# Patient Record
Sex: MALE | Race: WHITE | NOT HISPANIC OR LATINO | Employment: OTHER | URBAN - METROPOLITAN AREA
[De-identification: names, ages, dates, MRNs, and addresses within clinical notes are randomized per-mention and may not be internally consistent; named-entity substitution may affect disease eponyms.]

---

## 2022-03-20 PROBLEM — N40.1 BENIGN PROSTATIC HYPERPLASIA WITH INCOMPLETE BLADDER EMPTYING: Status: ACTIVE | Noted: 2022-03-20

## 2022-03-20 PROBLEM — R39.14 BENIGN PROSTATIC HYPERPLASIA WITH INCOMPLETE BLADDER EMPTYING: Status: ACTIVE | Noted: 2022-03-20

## 2022-03-20 PROBLEM — N21.0 BLADDER STONE: Status: ACTIVE | Noted: 2022-03-20

## 2022-03-20 PROBLEM — N20.0 NEPHROLITHIASIS: Status: ACTIVE | Noted: 2022-03-20

## 2022-03-20 NOTE — PROGRESS NOTES
Chief Complaint: Urologic complaint    Subjective         History of Present Illness  Carlos Kaiser is a 71 y.o. male     BPH  Bladder stones  Gross hematuria    Slow stream sometimes..  No trouble with initiation or intermittency of stream. Nocturia X2-3  .  No urgency or frequency.  No incontinence.  On Flomax 0.4 mg daily and Terazosin 2 mg daily.  He has been on these for a while.    Patient had gross hematuria few weeks back, never any before this.    2/4/2022 CT abdomen/pelvis without - Flaget - new from 2015 - 6 stones in the urinary bladder, measuring up to 3 mm with Arnett in place.  No hydronephrosis.  Soft tissue density in the presacral space with some dystrophic calcification similar to 2015.  Few small renal cyst slightly increased from 2015.  Largest is 4 cm in the upper pole right kidney.  No renal or ureteral stones.  Colostomy with small parastomal hernia.  December 2015.    PVR    3/22      35     1/22 UA-trace blood, urine culture negative    No dysuria or recurrent urinary tract infections.  No history of kidney stone.    No urologic family history,   Has never had any urologic surgery.    No cardiopulmonary history.  Patient does not smoke.  Patient does not use blood thinner.    PSA    Unsure if patient had any previous PSAs    Results for orders placed or performed in visit on 03/21/22   Bladder Scan   Result Value Ref Range    Urine Volume 35    POC Urinalysis Dipstick    Specimen: Urine   Result Value Ref Range    Color Yellow Yellow, Straw, Dark Yellow, Agnes    Clarity, UA Clear Clear    Glucose, UA Negative Negative, 1000 mg/dL (3+) mg/dL    Bilirubin Negative Negative    Ketones, UA Negative Negative    Specific Gravity  1.015 1.005 - 1.030    Blood, UA Trace (A) Negative    pH, Urine 5.5 5.0 - 8.0    Protein, POC Negative Negative mg/dL    Urobilinogen, UA Normal Normal    Leukocytes Negative Negative    Nitrite, UA Negative Negative         Objective     No past medical history on  file.    No past surgical history on file.    No current outpatient medications on file.    Not on File     No family history on file.    Social History     Socioeconomic History   • Marital status:        Vital Signs:   There were no vitals taken for this visit.     Physical exam    Alert and orient x3  Well appearing, well developed, in no acute distress   Unlabored respirations    Grossly oriented to person, place and time, judgment is intact, normal mood and affect    No results found for this or any previous visit.          Assessment and Plan    Diagnoses and all orders for this visit:    1. Benign prostatic hyperplasia with incomplete bladder emptying (Primary)    2. Bladder stone        Records reviewed today and summarized in chart     Gross hematuria    Discussed with the patient his bladder stones are likely causing the hematuria but that being said we do need to rule out underlying pathology.  Patient understands not doing this could risk missing a urologic malignancy which be detrimental to health or cause death.    I will get him set up for CT urology protocol and cystoscopy.  Risk and benefits discussed    PSA -patient has had no PSA screening in the last 2 years, we discussed this today we will get this ordered after risk/benefits were discussed  BMP    Urine culture      BPH/bladder stone    Continue tamsulosin and Terazosin.  Start finasteride 5 mg daily.  We did discuss possibility of TURP once we get him worked up.  Patient would possibly consider surgery in the future    Follow-up for office cystoscopy

## 2022-03-21 ENCOUNTER — OFFICE VISIT (OUTPATIENT)
Dept: UROLOGY | Facility: CLINIC | Age: 72
End: 2022-03-21

## 2022-03-21 VITALS — HEIGHT: 69 IN | BODY MASS INDEX: 37.92 KG/M2 | WEIGHT: 256 LBS | RESPIRATION RATE: 19 BRPM

## 2022-03-21 DIAGNOSIS — Z12.5 PROSTATE CANCER SCREENING: ICD-10-CM

## 2022-03-21 DIAGNOSIS — N40.1 BENIGN PROSTATIC HYPERPLASIA WITH INCOMPLETE BLADDER EMPTYING: Primary | ICD-10-CM

## 2022-03-21 DIAGNOSIS — R39.14 BENIGN PROSTATIC HYPERPLASIA WITH INCOMPLETE BLADDER EMPTYING: Primary | ICD-10-CM

## 2022-03-21 DIAGNOSIS — N21.0 BLADDER STONE: ICD-10-CM

## 2022-03-21 DIAGNOSIS — R31.0 GROSS HEMATURIA: ICD-10-CM

## 2022-03-21 LAB
BILIRUB BLD-MCNC: NEGATIVE MG/DL
CLARITY, POC: CLEAR
COLOR UR: YELLOW
GLUCOSE UR STRIP-MCNC: NEGATIVE MG/DL
KETONES UR QL: NEGATIVE
LEUKOCYTE EST, POC: NEGATIVE
NITRITE UR-MCNC: NEGATIVE MG/ML
PH UR: 5.5 [PH] (ref 5–8)
PROT UR STRIP-MCNC: NEGATIVE MG/DL
RBC # UR STRIP: ABNORMAL /UL
SP GR UR: 1.01 (ref 1–1.03)
URINE VOLUME: 35
UROBILINOGEN UR QL: NORMAL

## 2022-03-21 PROCEDURE — 81002 URINALYSIS NONAUTO W/O SCOPE: CPT | Performed by: UROLOGY

## 2022-03-21 PROCEDURE — 99204 OFFICE O/P NEW MOD 45 MIN: CPT | Performed by: UROLOGY

## 2022-03-21 PROCEDURE — 51798 US URINE CAPACITY MEASURE: CPT | Performed by: UROLOGY

## 2022-03-21 PROCEDURE — 87086 URINE CULTURE/COLONY COUNT: CPT | Performed by: UROLOGY

## 2022-03-21 RX ORDER — TERAZOSIN 2 MG/1
2 CAPSULE ORAL
COMMUNITY
Start: 2022-01-29 | End: 2022-03-21

## 2022-03-21 RX ORDER — POTASSIUM CHLORIDE 750 MG/1
10 CAPSULE, EXTENDED RELEASE ORAL DAILY
COMMUNITY
Start: 2022-02-28

## 2022-03-21 RX ORDER — TAMSULOSIN HYDROCHLORIDE 0.4 MG/1
1 CAPSULE ORAL EVERY MORNING
Status: ON HOLD | COMMUNITY
Start: 2022-02-28 | End: 2022-05-05

## 2022-03-21 RX ORDER — FINASTERIDE 5 MG/1
5 TABLET, FILM COATED ORAL DAILY
Qty: 90 TABLET | Refills: 4 | Status: ON HOLD | OUTPATIENT
Start: 2022-03-21 | End: 2022-05-05

## 2022-03-21 RX ORDER — LISINOPRIL AND HYDROCHLOROTHIAZIDE 25; 20 MG/1; MG/1
1 TABLET ORAL DAILY
COMMUNITY
Start: 2022-01-20

## 2022-03-22 LAB — BACTERIA SPEC AEROBE CULT: NO GROWTH

## 2022-03-24 ENCOUNTER — LAB (OUTPATIENT)
Dept: LAB | Facility: HOSPITAL | Age: 72
End: 2022-03-24

## 2022-03-24 DIAGNOSIS — N21.0 BLADDER STONE: ICD-10-CM

## 2022-03-24 DIAGNOSIS — R39.14 BENIGN PROSTATIC HYPERPLASIA WITH INCOMPLETE BLADDER EMPTYING: ICD-10-CM

## 2022-03-24 DIAGNOSIS — R31.0 GROSS HEMATURIA: ICD-10-CM

## 2022-03-24 DIAGNOSIS — Z12.5 PROSTATE CANCER SCREENING: ICD-10-CM

## 2022-03-24 DIAGNOSIS — N40.1 BENIGN PROSTATIC HYPERPLASIA WITH INCOMPLETE BLADDER EMPTYING: ICD-10-CM

## 2022-03-24 LAB
ANION GAP SERPL CALCULATED.3IONS-SCNC: 10.7 MMOL/L (ref 5–15)
BUN SERPL-MCNC: 23 MG/DL (ref 8–23)
BUN/CREAT SERPL: 20.9 (ref 7–25)
CALCIUM SPEC-SCNC: 9 MG/DL (ref 8.6–10.5)
CHLORIDE SERPL-SCNC: 103 MMOL/L (ref 98–107)
CO2 SERPL-SCNC: 26.3 MMOL/L (ref 22–29)
CREAT SERPL-MCNC: 1.1 MG/DL (ref 0.76–1.27)
EGFRCR SERPLBLD CKD-EPI 2021: 71.3 ML/MIN/1.73
GLUCOSE SERPL-MCNC: 141 MG/DL (ref 65–99)
POTASSIUM SERPL-SCNC: 3.7 MMOL/L (ref 3.5–5.2)
PSA SERPL-MCNC: 0.58 NG/ML (ref 0–4)
SODIUM SERPL-SCNC: 140 MMOL/L (ref 136–145)

## 2022-03-24 PROCEDURE — G0103 PSA SCREENING: HCPCS

## 2022-03-24 PROCEDURE — 36415 COLL VENOUS BLD VENIPUNCTURE: CPT

## 2022-03-24 PROCEDURE — 80048 BASIC METABOLIC PNL TOTAL CA: CPT

## 2022-03-30 ENCOUNTER — HOSPITAL ENCOUNTER (OUTPATIENT)
Dept: CT IMAGING | Facility: HOSPITAL | Age: 72
Discharge: HOME | End: 2022-03-30
Admitting: UROLOGY

## 2022-03-30 DIAGNOSIS — N40.1 BENIGN PROSTATIC HYPERPLASIA WITH INCOMPLETE BLADDER EMPTYING: ICD-10-CM

## 2022-03-30 DIAGNOSIS — N21.0 BLADDER STONE: ICD-10-CM

## 2022-03-30 DIAGNOSIS — R31.0 GROSS HEMATURIA: ICD-10-CM

## 2022-03-30 DIAGNOSIS — R39.14 BENIGN PROSTATIC HYPERPLASIA WITH INCOMPLETE BLADDER EMPTYING: ICD-10-CM

## 2022-03-30 PROCEDURE — 0 IOPAMIDOL PER 1 ML: Performed by: UROLOGY

## 2022-03-30 PROCEDURE — 74178 CT ABD&PLV WO CNTR FLWD CNTR: CPT

## 2022-03-30 RX ADMIN — IOPAMIDOL 100 ML: 755 INJECTION, SOLUTION INTRAVENOUS at 12:48

## 2022-04-04 ENCOUNTER — TELEPHONE (OUTPATIENT)
Dept: SURGERY | Facility: CLINIC | Age: 72
End: 2022-04-04

## 2022-04-04 NOTE — TELEPHONE ENCOUNTER
Spoke with patient, made him aware his appt was moved up because he has bladder stones and needs to be set up for surgery

## 2022-04-04 NOTE — TELEPHONE ENCOUNTER
Pt states that he had an appt scheduled for 5/3 and it was cancelled and moved up to 4/25. He is concerned and wants to know if something was wrong with tests/labs (that he may need to know prior to appt). CB#[phone].

## 2022-04-23 NOTE — PROGRESS NOTES
Chief Complaint: Urologic complaint    Subjective         History of Present Illness  Carlos Kaiser is a 71 y.o. male     BPH  Bladder stones  Gross hematuria    Slow stream sometimes..  No trouble with initiation or intermittency of stream. Nocturia X2-3  .  No urgency or frequency.  No incontinence.  On Flomax 0.4 mg daily and Terazosin 2 mg daily.  He has been on these for a while.      Patient was started on finasteride 5 mg at his last appointment.  Just like things are little better.  No side effects.    3/30/2022 CT abdomen/pelvis with and without -41 g prostate, 10 stones all about 1 cm.  Bilateral duplicated system, delayed phase okay.  Post treatment change in the presacral location with increased soft tissue density that could be seen with fibrosis.  Mass due to malignancy cannot be excluded.  If concern for residual mass consider PET    3/22   1.1, GFR 71      No cardiopulmonary history.  Patient does not smoke.  Patient does not use blood thinner.    Previous    2/22 gross hematuria, never before    2/4/2022 CT abdomen/pelvis without - Flaget - new from 2015 - 6 stones in the urinary bladder, measuring up to 3 mm with Arnett in place.  No hydronephrosis.  Soft tissue density in the presacral space with some dystrophic calcification similar to 2015.  Few small renal cyst slightly increased from 2015.  Largest is 4 cm in the upper pole right kidney.  No renal or ureteral stones.  Colostomy with small parastomal hernia.  December 2015.    PVR    3/22      35     1/22 UA-trace blood, urine culture negative    No dysuria or recurrent urinary tract infections.  No history of kidney stone.    No urologic family history,   Has never had any urologic surgery.      PSA    4/22     0.57       Results for orders placed or performed in visit on 03/24/22   Basic Metabolic Panel    Specimen: Blood   Result Value Ref Range    Glucose 141 (H) 65 - 99 mg/dL    BUN 23 8 - 23 mg/dL    Creatinine 1.10 0.76 - 1.27 mg/dL     Sodium 140 136 - 145 mmol/L    Potassium 3.7 3.5 - 5.2 mmol/L    Chloride 103 98 - 107 mmol/L    CO2 26.3 22.0 - 29.0 mmol/L    Calcium 9.0 8.6 - 10.5 mg/dL    BUN/Creatinine Ratio 20.9 7.0 - 25.0    Anion Gap 10.7 5.0 - 15.0 mmol/L    eGFR 71.3 >60.0 mL/min/1.73   PSA Screen    Specimen: Blood   Result Value Ref Range    PSA 0.579 0.000 - 4.000 ng/mL         Objective     Past Medical History:   Diagnosis Date   • Benign prostatic hyperplasia    • Cancer (HCC)    • Hypertension        Past Surgical History:   Procedure Laterality Date   • CHOLECYSTECTOMY     • COLON SURGERY           Current Outpatient Medications:   •  finasteride (PROSCAR) 5 MG tablet, Take 1 tablet by mouth Daily., Disp: 90 tablet, Rfl: 4  •  lisinopril-hydrochlorothiazide (PRINZIDE,ZESTORETIC) 20-25 MG per tablet, Take 1 tablet by mouth Daily., Disp: , Rfl:   •  potassium chloride (MICRO-K) 10 MEQ CR capsule, Take 10 mEq by mouth Daily., Disp: , Rfl:   •  tamsulosin (FLOMAX) 0.4 MG capsule 24 hr capsule, Take 1 capsule by mouth Every Morning., Disp: , Rfl:     Allergies   Allergen Reactions   • Penicillins Unknown - High Severity        Family History   Problem Relation Age of Onset   • Cancer Mother    • Cancer Maternal Grandmother        Social History     Socioeconomic History   • Marital status:    Tobacco Use   • Smoking status: Never Smoker   • Smokeless tobacco: Never Used   Vaping Use   • Vaping Use: Never used   Substance and Sexual Activity   • Alcohol use: Never   • Drug use: Never   • Sexual activity: Defer       Vital Signs:   There were no vitals taken for this visit.     Physical exam    Alert and orient x3  Well appearing, well developed, in no acute distress   Unlabored respirations    Grossly oriented to person, place and time, judgment is intact, normal mood and affect    Results for orders placed or performed in visit on 03/24/22   Basic Metabolic Panel    Specimen: Blood   Result Value Ref Range    Glucose 141 (H) 65  - 99 mg/dL    BUN 23 8 - 23 mg/dL    Creatinine 1.10 0.76 - 1.27 mg/dL    Sodium 140 136 - 145 mmol/L    Potassium 3.7 3.5 - 5.2 mmol/L    Chloride 103 98 - 107 mmol/L    CO2 26.3 22.0 - 29.0 mmol/L    Calcium 9.0 8.6 - 10.5 mg/dL    BUN/Creatinine Ratio 20.9 7.0 - 25.0    Anion Gap 10.7 5.0 - 15.0 mmol/L    eGFR 71.3 >60.0 mL/min/1.73   PSA Screen    Specimen: Blood   Result Value Ref Range    PSA 0.579 0.000 - 4.000 ng/mL             Assessment and Plan    Diagnoses and all orders for this visit:    1. Benign prostatic hyperplasia with incomplete bladder emptying (Primary)    2. Bladder stone    3. Gross hematuria            CT images and read reviewed and discussed with the patient    BPH/bladder stone/ GH      Continue tamsulosin, Terazosin,  finasteride 5 mg daily.    At this time after discussion we will get him set up for cystolitholapaxy and transurethral resection of prostate.  Risks and benefits were discussed including bleeding, infection and damage to the urinary system.  We also discussed the risk of anesthesia up to and including death.  Patient voiced understanding and would like to proceed.      I did discuss with patient that his CT scan did show some soft tissue density in the presacral region.  He was seeing Dr. Palomo medical oncology at [facility] in the past.  We will get him an appointment to follow-up with Dr. Palomo nonurgently to discuss whether or not he would want him to have a PET scan

## 2022-04-25 ENCOUNTER — PREP FOR SURGERY (OUTPATIENT)
Dept: OTHER | Facility: HOSPITAL | Age: 72
End: 2022-04-25

## 2022-04-25 ENCOUNTER — OFFICE VISIT (OUTPATIENT)
Dept: UROLOGY | Facility: CLINIC | Age: 72
End: 2022-04-25

## 2022-04-25 VITALS — BODY MASS INDEX: 37.8 KG/M2 | RESPIRATION RATE: 18 BRPM | HEIGHT: 69 IN

## 2022-04-25 DIAGNOSIS — R31.0 GROSS HEMATURIA: ICD-10-CM

## 2022-04-25 DIAGNOSIS — N21.0 BLADDER STONE: ICD-10-CM

## 2022-04-25 DIAGNOSIS — R39.14 BENIGN PROSTATIC HYPERPLASIA WITH INCOMPLETE BLADDER EMPTYING: Primary | ICD-10-CM

## 2022-04-25 DIAGNOSIS — N40.1 BENIGN PROSTATIC HYPERPLASIA WITH INCOMPLETE BLADDER EMPTYING: Primary | ICD-10-CM

## 2022-04-25 DIAGNOSIS — N40.0 BPH (BENIGN PROSTATIC HYPERPLASIA): Primary | ICD-10-CM

## 2022-04-25 PROCEDURE — 99214 OFFICE O/P EST MOD 30 MIN: CPT | Performed by: UROLOGY

## 2022-04-25 RX ORDER — SODIUM CHLORIDE 0.9 % (FLUSH) 0.9 %
3 SYRINGE (ML) INJECTION EVERY 12 HOURS SCHEDULED
Status: CANCELLED | OUTPATIENT
Start: 2022-04-25

## 2022-04-25 RX ORDER — LEVOFLOXACIN 5 MG/ML
500 INJECTION, SOLUTION INTRAVENOUS ONCE
Status: CANCELLED | OUTPATIENT
Start: 2022-04-25 | End: 2022-04-25

## 2022-04-25 RX ORDER — SODIUM CHLORIDE 0.9 % (FLUSH) 0.9 %
10 SYRINGE (ML) INJECTION AS NEEDED
Status: CANCELLED | OUTPATIENT
Start: 2022-04-25

## 2022-04-25 RX ORDER — SODIUM CHLORIDE 9 MG/ML
100 INJECTION, SOLUTION INTRAVENOUS CONTINUOUS
Status: CANCELLED | OUTPATIENT
Start: 2022-04-25

## 2022-04-25 NOTE — H&P
[facility]   UROLOGY HISTORY AND PHYSICAL    Patient Name: Carlos Kaiser  : 1950  MRN: [id number]  Primary Care Physician:  Rafi Ferraro  Date of admission: (Not on file)    Subjective   Subjective     Chief Complaint: Bladder stones BPH    HPI:    Carlos Kaiser is a 71 y.o. male bladder stones BPH    Review of Systems     10 systems reviewed and are negative other than what is listed in HPI    Personal History     Past Medical History:   Diagnosis Date   • Benign prostatic hyperplasia    • Cancer (HCC)    • Hypertension        Past Surgical History:   Procedure Laterality Date   • CHOLECYSTECTOMY     • COLON SURGERY         Family History: family history includes Cancer in his maternal grandmother and mother. Otherwise pertinent FHx was reviewed and not pertinent to current issue.    Social History:  reports that he has never smoked. He has never used smokeless tobacco. He reports that he does not drink alcohol and does not use drugs.    Home Medications:  finasteride, lisinopril-hydrochlorothiazide, potassium chloride, and tamsulosin      Allergies:  Allergies   Allergen Reactions   • Penicillins Unknown - High Severity       Objective   Objective     Vitals:   Resp:  [18] 18  Physical Exam    Constitutional: Awake, alert    Respiratory: Clear to auscultation bilaterally, nonlabored respirations    Cardiovascular: RRR, no murmurs, rubs, or gallops, palpable pedal pulses bilaterally    Skin: No rashes     Result Review    Result Review:  I have personally reviewed the results from the time of this admission to 2022 12:07 EDT and agree with these findings:  []  Laboratory  []  Microbiology  []  Radiology  []  EKG/Telemetry   []  Cardiology/Vascular   []  Pathology  []  Old records  []  Other:    Assessment/Plan   Assessment / Plan     Brief Patient Summary:  Carlos Kaiser is a 71 y.o. male     Active Hospital Problems:  There are no active hospital problems to display for this patient.  Bladder  stone/BPH    Plan:       cystolitholapaxy and transurethral resection of prostate.  Risks and benefits were discussed including bleeding, infection and damage to the urinary system.  We also discussed the risk of anesthesia up to and including death.  Patient voiced understanding and would like to proceed.    Electronically signed by Be Espana MD, 04/25/22, 12:07 PM EDT.

## 2022-04-28 ENCOUNTER — TELEPHONE (OUTPATIENT)
Dept: UROLOGY | Facility: CLINIC | Age: 72
End: 2022-04-28

## 2022-04-28 NOTE — TELEPHONE ENCOUNTER
Called [facility] to get patient set up to see Dr Palomo to review CT scan and determine plan. Patient scheduled to see Dr Palomo 05/25/22 at 0800 in [place]. Called patient to give his information, he verbalized understanding via teach back.

## 2022-04-29 ENCOUNTER — TELEPHONE (OUTPATIENT)
Dept: UROLOGY | Facility: CLINIC | Age: 72
End: 2022-04-29

## 2022-04-29 RX ORDER — PRAZOSIN HYDROCHLORIDE 2 MG/1
2 CAPSULE ORAL NIGHTLY
Status: ON HOLD | COMMUNITY
End: 2022-05-05

## 2022-04-29 NOTE — TELEPHONE ENCOUNTER
Patient scheduled for surgery 05/05/22.  He did a Covid test at [facility], [place], operated by [facility] in  [place] on 04/25/22.  He wants to know if the results have been received yet.

## 2022-04-29 NOTE — TELEPHONE ENCOUNTER
I left a message for patient to contact me. Covid test results have been received.  If the hospital doesn't accept the results,  Dr. Espana will deem urgent.  Patient is good for surgery.

## 2022-05-05 ENCOUNTER — ANESTHESIA EVENT (OUTPATIENT)
Dept: PERIOP | Facility: HOSPITAL | Age: 72
End: 2022-05-05

## 2022-05-05 ENCOUNTER — HOSPITAL ENCOUNTER (OUTPATIENT)
Facility: HOSPITAL | Age: 72
Discharge: HOME | End: 2022-05-06
Attending: UROLOGY | Admitting: UROLOGY

## 2022-05-05 ENCOUNTER — ANESTHESIA (OUTPATIENT)
Dept: PERIOP | Facility: HOSPITAL | Age: 72
End: 2022-05-05

## 2022-05-05 DIAGNOSIS — N21.0 BLADDER STONES: Primary | ICD-10-CM

## 2022-05-05 DIAGNOSIS — N40.0 BPH (BENIGN PROSTATIC HYPERPLASIA): ICD-10-CM

## 2022-05-05 LAB
ANION GAP SERPL CALCULATED.3IONS-SCNC: 9.7 MMOL/L (ref 5–15)
BUN SERPL-MCNC: 25 MG/DL (ref 8–23)
BUN/CREAT SERPL: 22.3 (ref 7–25)
CALCIUM SPEC-SCNC: 9.2 MG/DL (ref 8.6–10.5)
CHLORIDE SERPL-SCNC: 104 MMOL/L (ref 98–107)
CO2 SERPL-SCNC: 25.3 MMOL/L (ref 22–29)
CREAT SERPL-MCNC: 1.12 MG/DL (ref 0.76–1.27)
EGFRCR SERPLBLD CKD-EPI 2021: 69.8 ML/MIN/1.73
GLUCOSE SERPL-MCNC: 175 MG/DL (ref 65–99)
POTASSIUM SERPL-SCNC: 4.2 MMOL/L (ref 3.5–5.2)
SODIUM SERPL-SCNC: 139 MMOL/L (ref 136–145)

## 2022-05-05 PROCEDURE — 88300 SURGICAL PATH GROSS: CPT | Performed by: UROLOGY

## 2022-05-05 PROCEDURE — 94799 UNLISTED PULMONARY SVC/PX: CPT

## 2022-05-05 PROCEDURE — 25010000002 LEVOFLOXACIN PER 250 MG: Performed by: UROLOGY

## 2022-05-05 PROCEDURE — 52318 REMOVE BLADDER STONE: CPT | Performed by: UROLOGY

## 2022-05-05 PROCEDURE — 25010000002 MIDAZOLAM PER 1 MG: Performed by: ANESTHESIOLOGY

## 2022-05-05 PROCEDURE — 80048 BASIC METABOLIC PNL TOTAL CA: CPT | Performed by: ANESTHESIOLOGY

## 2022-05-05 PROCEDURE — 25010000002 ONDANSETRON PER 1 MG: Performed by: NURSE ANESTHETIST, CERTIFIED REGISTERED

## 2022-05-05 PROCEDURE — 93005 ELECTROCARDIOGRAM TRACING: CPT | Performed by: ANESTHESIOLOGY

## 2022-05-05 PROCEDURE — 25010000002 KETOROLAC TROMETHAMINE PER 15 MG: Performed by: NURSE ANESTHETIST, CERTIFIED REGISTERED

## 2022-05-05 PROCEDURE — 52601 PROSTATECTOMY (TURP): CPT | Performed by: UROLOGY

## 2022-05-05 PROCEDURE — 25010000002 PROPOFOL 10 MG/ML EMULSION: Performed by: NURSE ANESTHETIST, CERTIFIED REGISTERED

## 2022-05-05 PROCEDURE — 25010000002 DEXAMETHASONE PER 1 MG: Performed by: NURSE ANESTHETIST, CERTIFIED REGISTERED

## 2022-05-05 PROCEDURE — 25010000002 FENTANYL CITRATE (PF) 50 MCG/ML SOLUTION: Performed by: NURSE ANESTHETIST, CERTIFIED REGISTERED

## 2022-05-05 PROCEDURE — 25010000002 HYDROMORPHONE 1 MG/ML SOLUTION: Performed by: NURSE ANESTHETIST, CERTIFIED REGISTERED

## 2022-05-05 PROCEDURE — 93010 ELECTROCARDIOGRAM REPORT: CPT | Performed by: INTERNAL MEDICINE

## 2022-05-05 PROCEDURE — 88305 TISSUE EXAM BY PATHOLOGIST: CPT | Performed by: UROLOGY

## 2022-05-05 PROCEDURE — 82365 CALCULUS SPECTROSCOPY: CPT | Performed by: UROLOGY

## 2022-05-05 RX ORDER — SODIUM CHLORIDE 9 MG/ML
100 INJECTION, SOLUTION INTRAVENOUS CONTINUOUS
Status: DISCONTINUED | OUTPATIENT
Start: 2022-05-05 | End: 2022-05-05

## 2022-05-05 RX ORDER — MEPERIDINE HYDROCHLORIDE 25 MG/ML
12.5 INJECTION INTRAMUSCULAR; INTRAVENOUS; SUBCUTANEOUS
Status: DISCONTINUED | OUTPATIENT
Start: 2022-05-05 | End: 2022-05-05 | Stop reason: HOSPADM

## 2022-05-05 RX ORDER — EPHEDRINE SULFATE 50 MG/ML
INJECTION, SOLUTION INTRAVENOUS AS NEEDED
Status: DISCONTINUED | OUTPATIENT
Start: 2022-05-05 | End: 2022-05-05 | Stop reason: SURG

## 2022-05-05 RX ORDER — ACETAMINOPHEN 500 MG
1000 TABLET ORAL ONCE
Status: COMPLETED | OUTPATIENT
Start: 2022-05-05 | End: 2022-05-05

## 2022-05-05 RX ORDER — PROMETHAZINE HYDROCHLORIDE 25 MG/1
25 SUPPOSITORY RECTAL ONCE AS NEEDED
Status: DISCONTINUED | OUTPATIENT
Start: 2022-05-05 | End: 2022-05-05 | Stop reason: HOSPADM

## 2022-05-05 RX ORDER — ONDANSETRON 2 MG/ML
INJECTION INTRAMUSCULAR; INTRAVENOUS AS NEEDED
Status: DISCONTINUED | OUTPATIENT
Start: 2022-05-05 | End: 2022-05-05 | Stop reason: SURG

## 2022-05-05 RX ORDER — TERAZOSIN 2 MG/1
2 CAPSULE ORAL NIGHTLY
COMMUNITY

## 2022-05-05 RX ORDER — SODIUM CHLORIDE, SODIUM LACTATE, POTASSIUM CHLORIDE, CALCIUM CHLORIDE 600; 310; 30; 20 MG/100ML; MG/100ML; MG/100ML; MG/100ML
9 INJECTION, SOLUTION INTRAVENOUS CONTINUOUS PRN
Status: DISCONTINUED | OUTPATIENT
Start: 2022-05-05 | End: 2022-05-05 | Stop reason: HOSPADM

## 2022-05-05 RX ORDER — SODIUM CHLORIDE 0.9 % (FLUSH) 0.9 %
3 SYRINGE (ML) INJECTION EVERY 12 HOURS SCHEDULED
Status: DISCONTINUED | OUTPATIENT
Start: 2022-05-05 | End: 2022-05-05 | Stop reason: HOSPADM

## 2022-05-05 RX ORDER — ONDANSETRON 2 MG/ML
4 INJECTION INTRAMUSCULAR; INTRAVENOUS EVERY 6 HOURS PRN
Status: DISCONTINUED | OUTPATIENT
Start: 2022-05-05 | End: 2022-05-06 | Stop reason: HOSPADM

## 2022-05-05 RX ORDER — TAMSULOSIN HYDROCHLORIDE 0.4 MG/1
0.4 CAPSULE ORAL DAILY
COMMUNITY
End: 2022-05-06 | Stop reason: HOSPADM

## 2022-05-05 RX ORDER — TAMSULOSIN HYDROCHLORIDE 0.4 MG/1
1 CAPSULE ORAL DAILY
Status: ON HOLD | COMMUNITY
End: 2022-05-05

## 2022-05-05 RX ORDER — SODIUM CHLORIDE 9 MG/ML
60 INJECTION, SOLUTION INTRAVENOUS CONTINUOUS
Status: DISCONTINUED | OUTPATIENT
Start: 2022-05-05 | End: 2022-05-06 | Stop reason: HOSPADM

## 2022-05-05 RX ORDER — PROMETHAZINE HYDROCHLORIDE 12.5 MG/1
25 TABLET ORAL ONCE AS NEEDED
Status: DISCONTINUED | OUTPATIENT
Start: 2022-05-05 | End: 2022-05-05 | Stop reason: HOSPADM

## 2022-05-05 RX ORDER — TERAZOSIN 5 MG/1
5 CAPSULE ORAL NIGHTLY
Status: DISCONTINUED | OUTPATIENT
Start: 2022-05-05 | End: 2022-05-05

## 2022-05-05 RX ORDER — SODIUM CHLORIDE 0.9 % (FLUSH) 0.9 %
10 SYRINGE (ML) INJECTION AS NEEDED
Status: DISCONTINUED | OUTPATIENT
Start: 2022-05-05 | End: 2022-05-06 | Stop reason: HOSPADM

## 2022-05-05 RX ORDER — SODIUM CHLORIDE 0.9 % (FLUSH) 0.9 %
10 SYRINGE (ML) INJECTION EVERY 12 HOURS SCHEDULED
Status: DISCONTINUED | OUTPATIENT
Start: 2022-05-05 | End: 2022-05-06 | Stop reason: HOSPADM

## 2022-05-05 RX ORDER — ACETAMINOPHEN 650 MG/1
650 SUPPOSITORY RECTAL EVERY 4 HOURS PRN
Status: DISCONTINUED | OUTPATIENT
Start: 2022-05-05 | End: 2022-05-06 | Stop reason: HOSPADM

## 2022-05-05 RX ORDER — OXYCODONE HYDROCHLORIDE 5 MG/1
5 TABLET ORAL
Status: DISCONTINUED | OUTPATIENT
Start: 2022-05-05 | End: 2022-05-05 | Stop reason: HOSPADM

## 2022-05-05 RX ORDER — SODIUM CHLORIDE 0.9 % (FLUSH) 0.9 %
10 SYRINGE (ML) INJECTION AS NEEDED
Status: DISCONTINUED | OUTPATIENT
Start: 2022-05-05 | End: 2022-05-05 | Stop reason: HOSPADM

## 2022-05-05 RX ORDER — KETOROLAC TROMETHAMINE 30 MG/ML
INJECTION, SOLUTION INTRAMUSCULAR; INTRAVENOUS AS NEEDED
Status: DISCONTINUED | OUTPATIENT
Start: 2022-05-05 | End: 2022-05-05 | Stop reason: SURG

## 2022-05-05 RX ORDER — DEXAMETHASONE SODIUM PHOSPHATE 4 MG/ML
INJECTION, SOLUTION INTRA-ARTICULAR; INTRALESIONAL; INTRAMUSCULAR; INTRAVENOUS; SOFT TISSUE AS NEEDED
Status: DISCONTINUED | OUTPATIENT
Start: 2022-05-05 | End: 2022-05-05 | Stop reason: SURG

## 2022-05-05 RX ORDER — FINASTERIDE 5 MG/1
5 TABLET, FILM COATED ORAL DAILY
COMMUNITY
End: 2022-05-06 | Stop reason: HOSPADM

## 2022-05-05 RX ORDER — PRAZOSIN HYDROCHLORIDE 1 MG/1
2 CAPSULE ORAL NIGHTLY
Status: DISCONTINUED | OUTPATIENT
Start: 2022-05-05 | End: 2022-05-06 | Stop reason: HOSPADM

## 2022-05-05 RX ORDER — ACETAMINOPHEN 325 MG/1
650 TABLET ORAL EVERY 4 HOURS PRN
Status: DISCONTINUED | OUTPATIENT
Start: 2022-05-05 | End: 2022-05-06 | Stop reason: HOSPADM

## 2022-05-05 RX ORDER — ONDANSETRON 4 MG/1
4 TABLET, FILM COATED ORAL EVERY 6 HOURS PRN
Status: DISCONTINUED | OUTPATIENT
Start: 2022-05-05 | End: 2022-05-06 | Stop reason: HOSPADM

## 2022-05-05 RX ORDER — DOCUSATE SODIUM 100 MG/1
100 CAPSULE, LIQUID FILLED ORAL 2 TIMES DAILY PRN
Status: DISCONTINUED | OUTPATIENT
Start: 2022-05-05 | End: 2022-05-06 | Stop reason: HOSPADM

## 2022-05-05 RX ORDER — ROCURONIUM BROMIDE 10 MG/ML
INJECTION, SOLUTION INTRAVENOUS AS NEEDED
Status: DISCONTINUED | OUTPATIENT
Start: 2022-05-05 | End: 2022-05-05 | Stop reason: SURG

## 2022-05-05 RX ORDER — MIDAZOLAM HYDROCHLORIDE 1 MG/ML
2 INJECTION INTRAMUSCULAR; INTRAVENOUS ONCE
Status: COMPLETED | OUTPATIENT
Start: 2022-05-05 | End: 2022-05-05

## 2022-05-05 RX ORDER — FENTANYL CITRATE 50 UG/ML
INJECTION, SOLUTION INTRAMUSCULAR; INTRAVENOUS AS NEEDED
Status: DISCONTINUED | OUTPATIENT
Start: 2022-05-05 | End: 2022-05-05 | Stop reason: SURG

## 2022-05-05 RX ORDER — LIDOCAINE HYDROCHLORIDE 20 MG/ML
INJECTION, SOLUTION EPIDURAL; INFILTRATION; INTRACAUDAL; PERINEURAL AS NEEDED
Status: DISCONTINUED | OUTPATIENT
Start: 2022-05-05 | End: 2022-05-05 | Stop reason: SURG

## 2022-05-05 RX ORDER — MAGNESIUM HYDROXIDE 1200 MG/15ML
LIQUID ORAL AS NEEDED
Status: DISCONTINUED | OUTPATIENT
Start: 2022-05-05 | End: 2022-05-05 | Stop reason: HOSPADM

## 2022-05-05 RX ORDER — FINASTERIDE 5 MG/1
5 TABLET, FILM COATED ORAL DAILY
Status: ON HOLD | COMMUNITY
End: 2022-05-05

## 2022-05-05 RX ORDER — OXYCODONE HYDROCHLORIDE 5 MG/1
5 TABLET ORAL EVERY 4 HOURS PRN
Status: DISCONTINUED | OUTPATIENT
Start: 2022-05-05 | End: 2022-05-06 | Stop reason: HOSPADM

## 2022-05-05 RX ORDER — PROPOFOL 10 MG/ML
VIAL (ML) INTRAVENOUS AS NEEDED
Status: DISCONTINUED | OUTPATIENT
Start: 2022-05-05 | End: 2022-05-05 | Stop reason: SURG

## 2022-05-05 RX ORDER — ONDANSETRON 2 MG/ML
4 INJECTION INTRAMUSCULAR; INTRAVENOUS ONCE AS NEEDED
Status: DISCONTINUED | OUTPATIENT
Start: 2022-05-05 | End: 2022-05-05 | Stop reason: HOSPADM

## 2022-05-05 RX ORDER — LEVOFLOXACIN 5 MG/ML
500 INJECTION, SOLUTION INTRAVENOUS ONCE
Status: COMPLETED | OUTPATIENT
Start: 2022-05-05 | End: 2022-05-05

## 2022-05-05 RX ADMIN — KETOROLAC TROMETHAMINE 30 MG: 30 INJECTION, SOLUTION INTRAMUSCULAR; INTRAVENOUS at 09:25

## 2022-05-05 RX ADMIN — PROPOFOL 200 MG: 10 INJECTION, EMULSION INTRAVENOUS at 09:15

## 2022-05-05 RX ADMIN — SUGAMMADEX 200 MG: 100 INJECTION, SOLUTION INTRAVENOUS at 10:40

## 2022-05-05 RX ADMIN — HYDROMORPHONE HYDROCHLORIDE 0.5 MG: 1 INJECTION, SOLUTION INTRAMUSCULAR; INTRAVENOUS; SUBCUTANEOUS at 10:47

## 2022-05-05 RX ADMIN — PRAZOSIN HYDROCHLORIDE 2 MG: 1 CAPSULE ORAL at 20:34

## 2022-05-05 RX ADMIN — DEXAMETHASONE SODIUM PHOSPHATE 4 MG: 4 INJECTION, SOLUTION INTRA-ARTICULAR; INTRALESIONAL; INTRAMUSCULAR; INTRAVENOUS; SOFT TISSUE at 09:15

## 2022-05-05 RX ADMIN — LEVOFLOXACIN 500 MG: 5 INJECTION, SOLUTION INTRAVENOUS at 09:13

## 2022-05-05 RX ADMIN — HYDROMORPHONE HYDROCHLORIDE 0.5 MG: 1 INJECTION, SOLUTION INTRAMUSCULAR; INTRAVENOUS; SUBCUTANEOUS at 10:45

## 2022-05-05 RX ADMIN — SODIUM CHLORIDE 60 ML/HR: 9 INJECTION, SOLUTION INTRAVENOUS at 16:21

## 2022-05-05 RX ADMIN — LIDOCAINE HYDROCHLORIDE 50 MG: 20 INJECTION, SOLUTION EPIDURAL; INFILTRATION; INTRACAUDAL; PERINEURAL at 09:15

## 2022-05-05 RX ADMIN — SODIUM CHLORIDE, POTASSIUM CHLORIDE, SODIUM LACTATE AND CALCIUM CHLORIDE: 600; 310; 30; 20 INJECTION, SOLUTION INTRAVENOUS at 10:50

## 2022-05-05 RX ADMIN — FENTANYL CITRATE 50 MCG: 50 INJECTION, SOLUTION INTRAMUSCULAR; INTRAVENOUS at 10:12

## 2022-05-05 RX ADMIN — ONDANSETRON 4 MG: 2 INJECTION INTRAMUSCULAR; INTRAVENOUS at 10:40

## 2022-05-05 RX ADMIN — Medication 10 ML: at 20:35

## 2022-05-05 RX ADMIN — MIDAZOLAM HYDROCHLORIDE 2 MG: 1 INJECTION, SOLUTION INTRAMUSCULAR; INTRAVENOUS at 08:37

## 2022-05-05 RX ADMIN — EPHEDRINE SULFATE 15 MG: 50 INJECTION INTRAVENOUS at 09:41

## 2022-05-05 RX ADMIN — ACETAMINOPHEN 1000 MG: 500 TABLET ORAL at 08:37

## 2022-05-05 RX ADMIN — ROCURONIUM BROMIDE 40 MG: 10 INJECTION INTRAVENOUS at 09:15

## 2022-05-05 RX ADMIN — FENTANYL CITRATE 50 MCG: 50 INJECTION, SOLUTION INTRAMUSCULAR; INTRAVENOUS at 09:15

## 2022-05-05 RX ADMIN — PROPOFOL 50 MG: 10 INJECTION, EMULSION INTRAVENOUS at 10:44

## 2022-05-05 RX ADMIN — SODIUM CHLORIDE, POTASSIUM CHLORIDE, SODIUM LACTATE AND CALCIUM CHLORIDE 9 ML/HR: 600; 310; 30; 20 INJECTION, SOLUTION INTRAVENOUS at 08:36

## 2022-05-05 NOTE — ANESTHESIA PREPROCEDURE EVALUATION
Anesthesia Evaluation     Patient summary reviewed and Nursing notes reviewed   no history of anesthetic complications:  NPO Solid Status: > 8 hours  NPO Liquid Status: > 2 hours           Airway   Mallampati: II  TM distance: >3 FB  Neck ROM: full  No difficulty expected  Dental      Pulmonary - negative pulmonary ROS and normal exam    breath sounds clear to auscultation  Cardiovascular - normal exam  Exercise tolerance: good (4-7 METS)    ECG reviewed  Rhythm: regular  Rate: normal    (+) hypertension,       Neuro/Psych- negative ROS  GI/Hepatic/Renal/Endo    (+)   renal disease,     Musculoskeletal (-) negative ROS    Abdominal    Substance History - negative use     OB/GYN negative ob/gyn ROS         Other - negative ROS                       Anesthesia Plan    ASA 3     general       Anesthetic plan, all risks, benefits, and alternatives have been provided, discussed and informed consent has been obtained with: patient.        CODE STATUS:

## 2022-05-05 NOTE — PLAN OF CARE
Problem: Adult Inpatient Plan of Care  Goal: Plan of Care Review  Outcome: Ongoing, Progressing  Goal: Patient-Specific Goal (Individualized)  Outcome: Ongoing, Progressing  Flowsheets (Taken 5/5/2022 1300)  Anxieties, Fears or Concerns: none  Goal: Absence of Hospital-Acquired Illness or Injury  Outcome: Ongoing, Progressing  Intervention: Prevent Skin Injury  Recent Flowsheet Documentation  Taken 5/5/2022 1200 by Heriberto Salvador RN  Body Position:   legs elevated   weight shifting  Goal: Optimal Comfort and Wellbeing  Outcome: Ongoing, Progressing  Intervention: Monitor Pain and Promote Comfort  Recent Flowsheet Documentation  Taken 5/5/2022 1200 by Heriberto Salvador RN  Pain Management Interventions:   pain management plan reviewed with patient/caregiver   position adjusted  Intervention: Provide Person-Centered Care  Recent Flowsheet Documentation  Taken 5/5/2022 1200 by Heriberto Salvador RN  Trust Relationship/Rapport:   care explained   thoughts/feelings acknowledged   reassurance provided  Goal: Readiness for Transition of Care  Outcome: Ongoing, Progressing  Intervention: Mutually Develop Transition Plan  Recent Flowsheet Documentation  Taken 5/5/2022 1404 by Heriberto Salvador RN  Equipment Currently Used at Home: none  Taken 5/5/2022 1403 by Heriberto Salvador RN  Transportation Anticipated: family or friend will provide  Patient/Family Anticipated Services at Transition: none  Patient/Family Anticipates Transition to: home with family   Goal Outcome Evaluation:

## 2022-05-05 NOTE — OP NOTE
CYSTOSCOPY TRANSURETHRAL RESECTION OF PROSTATE  Procedure Report    Patient Name:  Carlos Kaiser  YOB: 1950    Date of Surgery:  5/5/2022      Pre-op Diagnosis:   BPH (benign prostatic hyperplasia) [N40.0]       Postop diagnosis:    Same    Procedure/CPT® Codes:      Procedure(s):    Cystolitholapaxy, 6 -1 cm stones(total of 6 cm of stone)  transurethral resection of prostate    Staff:  Surgeon(s):  Be Espana MD         Anesthesia: General    Estimated Blood Loss: minimal    Implants:    Nothing was implanted during the procedure    Specimen:          Specimens     ID Source Type Tests Collected By Collected At Frozen?    A Prostate Tissue · TISSUE PATHOLOGY EXAM   Be Espana MD 5/5/22 1026 No    Description: PROSTATE CHIPS    B Urinary Bladder Calculus · TISSUE PATHOLOGY EXAM  · STONE ANALYSIS   Be Espana MD 5/5/22 1027 No    Description: BLADDER STONES              Findings:     6-1 cm stones all removed with the shock pulse device    Complete duplicated system on the right side with 2 ureteral orfices.  The most lateral and cephalad orifice was gaping.  Bladder was a little erythematous secondary to the stones but no pathology.  Left side only had one ureteral orifice, but on CT it was duplicated almost down to the bladder.    Prostate was opened up without issue 4 cm    Complications: none    Description of Procedure:     Brief description of procedure    After informed consent patient was taken to the operating room.  Patient was placed supine and placed under general anesthesia by the anesthesia team.  Patient was prepped and draped in normal sterile fashion after being placed in dorsal lithotomy position.  A multidisciplinary timeout was undertaken documenting the correct patient site and procedure.  At this point a 22 rigid cystoscope was advanced into the urethra.  Anterior urethra and prostatic urethra were normal.  Bladder was examined     See findings section    At  this point I went ahead and placed the nephroscope in and used the shock pulse device to pulverize the stones this took about 40 minutes.    All pieces were flushed out with a EIlick evacuator.      At this time I used a visual obturator to place a resectoscope into the bladder.  A large bipolar loop was then placed into the bladder and the median lobe was started.  This was resected down to the right above the verumontanum.  I then did the left lateral lobe and the right lateral lobe.  Also took a small amount of anterior tissue.  After this all pieces were removed from the bladder with Ellik evacuator.  Hemostasis was obtained.  22 Welsh three-way Arnett catheter was placed into the bladder with 30 cc of sterile water placed in the balloon.  This was placed to straight drainage and also attached to continuous bladder irrigation.  Patient tolerated the procedure well and was taken to the post anesthesia area without issue.          Be Espana MD     Date: 5/5/2022  Time: 10:48 EDT

## 2022-05-06 VITALS
OXYGEN SATURATION: 100 % | HEIGHT: 69 IN | WEIGHT: 247.14 LBS | SYSTOLIC BLOOD PRESSURE: 155 MMHG | HEART RATE: 75 BPM | BODY MASS INDEX: 36.6 KG/M2 | RESPIRATION RATE: 18 BRPM | TEMPERATURE: 98.24 F | DIASTOLIC BLOOD PRESSURE: 66 MMHG

## 2022-05-06 LAB
ANION GAP SERPL CALCULATED.3IONS-SCNC: 9.6 MMOL/L (ref 5–15)
BUN SERPL-MCNC: 22 MG/DL (ref 8–23)
BUN/CREAT SERPL: 19.5 (ref 7–25)
CALCIUM SPEC-SCNC: 9.5 MG/DL (ref 8.6–10.5)
CHLORIDE SERPL-SCNC: 105 MMOL/L (ref 98–107)
CO2 SERPL-SCNC: 24.4 MMOL/L (ref 22–29)
CREAT SERPL-MCNC: 1.13 MG/DL (ref 0.76–1.27)
CYTO UR: NORMAL
EGFRCR SERPLBLD CKD-EPI 2021: 69.1 ML/MIN/1.73
GLUCOSE SERPL-MCNC: 134 MG/DL (ref 65–99)
LAB AP CASE REPORT: NORMAL
LAB AP CLINICAL INFORMATION: NORMAL
PATH REPORT.FINAL DX SPEC: NORMAL
PATH REPORT.GROSS SPEC: NORMAL
POTASSIUM SERPL-SCNC: 4.6 MMOL/L (ref 3.5–5.2)
SODIUM SERPL-SCNC: 139 MMOL/L (ref 136–145)

## 2022-05-06 PROCEDURE — 94799 UNLISTED PULMONARY SVC/PX: CPT

## 2022-05-06 PROCEDURE — 80048 BASIC METABOLIC PNL TOTAL CA: CPT | Performed by: UROLOGY

## 2022-05-06 RX ORDER — HYDROCODONE BITARTRATE AND ACETAMINOPHEN 5; 325 MG/1; MG/1
1 TABLET ORAL EVERY 6 HOURS PRN
Qty: 15 TABLET | Refills: 0 | Status: SHIPPED | OUTPATIENT
Start: 2022-05-06 | End: 2022-06-06

## 2022-05-06 RX ADMIN — ACETAMINOPHEN 650 MG: 325 TABLET ORAL at 04:32

## 2022-05-06 RX ADMIN — SODIUM CHLORIDE 60 ML/HR: 9 INJECTION, SOLUTION INTRAVENOUS at 07:31

## 2022-05-06 RX ADMIN — Medication 10 ML: at 08:46

## 2022-05-06 RX ADMIN — HYDROCHLOROTHIAZIDE: 25 TABLET ORAL at 08:46

## 2022-05-06 NOTE — PLAN OF CARE
Problem: Adult Inpatient Plan of Care  Goal: Plan of Care Review  Outcome: Met  Goal: Patient-Specific Goal (Individualized)  Outcome: Met  Goal: Absence of Hospital-Acquired Illness or Injury  Outcome: Met  Intervention: Identify and Manage Fall Risk  Recent Flowsheet Documentation  Taken 5/6/2022 0830 by Heriberto Salvador RN  Safety Promotion/Fall Prevention: safety round/check completed  Intervention: Prevent Skin Injury  Recent Flowsheet Documentation  Taken 5/6/2022 0830 by Heriberto Salvador RN  Body Position:   position changed independently   weight shifting  Skin Protection: adhesive use limited  Intervention: Prevent and Manage VTE (Venous Thromboembolism) Risk  Recent Flowsheet Documentation  Taken 5/6/2022 0830 by Heriberto Salvador RN  Activity Management: up in chair  VTE Prevention/Management: sequential compression devices on  Intervention: Prevent Infection  Recent Flowsheet Documentation  Taken 5/6/2022 0830 by Heriberto Salvador RN  Infection Prevention: single patient room provided  Goal: Optimal Comfort and Wellbeing  Outcome: Met  Intervention: Monitor Pain and Promote Comfort  Recent Flowsheet Documentation  Taken 5/6/2022 0830 by Heriberto Salvador RN  Pain Management Interventions: pain management plan reviewed with patient/caregiver  Intervention: Provide Person-Centered Care  Recent Flowsheet Documentation  Taken 5/6/2022 0830 by Heriberto Salvador RN  Trust Relationship/Rapport: care explained  Goal: Readiness for Transition of Care  Outcome: Met     Problem: Bleeding (TURP)  Goal: Absence of Bleeding  Outcome: Met     Problem: Fluid and Electrolyte Imbalance (TURP)  Goal: Fluid and Electrolyte Balance  Outcome: Met     Problem: Infection (TURP)  Goal: Absence of Infection Signs and Symptoms  Outcome: Met     Problem: Ongoing Anesthesia Effects (TURP)  Goal: Anesthesia/Sedation Recovery  Outcome: Met  Intervention: Optimize Anesthesia Recovery  Recent Flowsheet  Documentation  Taken 5/6/2022 0830 by Heriberto Salvador RN  Patient Tolerance (IS): good  Safety Promotion/Fall Prevention: safety round/check completed  Administration (IS): instruction provided, follow-up  Level Incentive Spirometer (mL): 3000  Incentive Spirometer Predicted Level (mL): 2000  Number of Repetitions (IS): 10     Problem: Pain (TURP)  Goal: Acceptable Pain Control  Outcome: Met  Intervention: Prevent or Manage Pain  Recent Flowsheet Documentation  Taken 5/6/2022 0830 by Heriberto Salvador RN  Pain Management Interventions: pain management plan reviewed with patient/caregiver  Diversional Activities: smartphone     Problem: Postoperative Nausea and Vomiting (TURP)  Goal: Nausea and Vomiting Relief  Outcome: Met     Problem: Respiratory Compromise (TURP)  Goal: Effective Oxygenation and Ventilation  Outcome: Met  Intervention: Optimize Oxygenation and Ventilation  Recent Flowsheet Documentation  Taken 5/6/2022 0830 by Heriberto Salvador RN  Head of Bed (HOB) Positioning: HOB elevated     Problem: Urinary Elimination Impaired (TURP)  Goal: Effective Urinary Elimination  Outcome: Met  Intervention: Monitor and Manage Urine Output  Recent Flowsheet Documentation  Taken 5/6/2022 0830 by Heriberto Salvador RN  Urinary Elimination Promotion: catheter patency maintained   Goal Outcome Evaluation:

## 2022-05-06 NOTE — PLAN OF CARE
Goal Outcome Evaluation:     Pt is doing great on post-op day 1. CBI output is yellow with a pink-tinge. No clots visible. Pt states his pain is minimal, given one dose of Tylenol. No BM yet but he is passing gas and denies any nausea. He ambulated to chair this morning and is continuing to use his IS frequently.         Progress: improving

## 2022-05-07 LAB — QT INTERVAL: 351 MS

## 2022-05-09 ENCOUNTER — OFFICE VISIT (OUTPATIENT)
Dept: UROLOGY | Facility: CLINIC | Age: 72
End: 2022-05-09

## 2022-05-09 VITALS — HEIGHT: 69 IN | WEIGHT: 248 LBS | BODY MASS INDEX: 36.73 KG/M2 | RESPIRATION RATE: 16 BRPM

## 2022-05-09 DIAGNOSIS — N40.1 BENIGN PROSTATIC HYPERPLASIA WITH INCOMPLETE BLADDER EMPTYING: Primary | ICD-10-CM

## 2022-05-09 DIAGNOSIS — R39.14 BENIGN PROSTATIC HYPERPLASIA WITH INCOMPLETE BLADDER EMPTYING: Primary | ICD-10-CM

## 2022-05-09 LAB — URINE VOLUME: 0

## 2022-05-09 PROCEDURE — 99024 POSTOP FOLLOW-UP VISIT: CPT | Performed by: NURSE PRACTITIONER

## 2022-05-09 PROCEDURE — 51700 IRRIGATION OF BLADDER: CPT | Performed by: NURSE PRACTITIONER

## 2022-05-09 NOTE — PROGRESS NOTES
"Chief Complaint: Follow-up    Subjective         History of Present Illness  Carlos Kaiser is a 71 y.o. male presents to [facility] UROLOGY to be seen for void trial .    Patient is 4 days post TURP cystolitholapaxy.    He states some hematuria still but improved.     No pain.     Objective     Past Medical History:   Diagnosis Date   • Benign prostatic hyperplasia    • Bladder stone    • Cancer (HCC)     COLON CANCER- 2012- HAD CHEMO/ RADIATION   • Hypertension        Past Surgical History:   Procedure Laterality Date   • CHOLECYSTECTOMY  2012   • COLON SURGERY  2012    R/T CANCER (2012 AND 2014)   • COLOSTOMY     • CYSTOSCOPY TRANSURETHRAL RESECTION OF PROSTATE N/A 5/5/2022    Procedure: cystolitholapaxy and transurethral resection of prostate;  Surgeon: Be Espana MD;  Location: [facility];  Service: Urology;  Laterality: N/A;         Current Outpatient Medications:   •  HYDROcodone-acetaminophen (NORCO) 5-325 MG per tablet, Take 1 tablet by mouth Every 6 (Six) Hours As Needed for Mild Pain ., Disp: 15 tablet, Rfl: 0  •  lisinopril-hydrochlorothiazide (PRINZIDE,ZESTORETIC) 20-25 MG per tablet, Take 1 tablet by mouth Daily., Disp: , Rfl:   •  potassium chloride (MICRO-K) 10 MEQ CR capsule, Take 10 mEq by mouth Daily., Disp: , Rfl:   •  terazosin (HYTRIN) 2 MG capsule, Take 2 mg by mouth Every Night., Disp: , Rfl:     Allergies   Allergen Reactions   • Penicillins Rash     CHILDHOOD ALLERGY- NOT HAD SINCE THEN        Family History   Problem Relation Age of Onset   • Cancer Mother    • Cancer Maternal Grandmother    • Malig Hyperthermia Neg Hx        Social History     Socioeconomic History   • Marital status:    Tobacco Use   • Smoking status: Never Smoker   • Smokeless tobacco: Never Used   Vaping Use   • Vaping Use: Never used   Substance and Sexual Activity   • Alcohol use: Never   • Drug use: Never   • Sexual activity: Defer       Vital Signs:   Resp 16   Ht 175.3 cm (69\")  "  Wt 112 kg (248 lb)   BMI 36.62 kg/m²      Physical Exam     Result Review :   The following data was reviewed by: MICKEY Jones on 05/09/2022:  Results for orders placed or performed during the hospital encounter of 05/05/22   Basic Metabolic Panel    Specimen: Blood   Result Value Ref Range    Glucose 175 (H) 65 - 99 mg/dL    BUN 25 (H) 8 - 23 mg/dL    Creatinine 1.12 0.76 - 1.27 mg/dL    Sodium 139 136 - 145 mmol/L    Potassium 4.2 3.5 - 5.2 mmol/L    Chloride 104 98 - 107 mmol/L    CO2 25.3 22.0 - 29.0 mmol/L    Calcium 9.2 8.6 - 10.5 mg/dL    BUN/Creatinine Ratio 22.3 7.0 - 25.0    Anion Gap 9.7 5.0 - 15.0 mmol/L    eGFR 69.8 >60.0 mL/min/1.73   Basic Metabolic Panel    Specimen: Blood   Result Value Ref Range    Glucose 134 (H) 65 - 99 mg/dL    BUN 22 8 - 23 mg/dL    Creatinine 1.13 0.76 - 1.27 mg/dL    Sodium 139 136 - 145 mmol/L    Potassium 4.6 3.5 - 5.2 mmol/L    Chloride 105 98 - 107 mmol/L    CO2 24.4 22.0 - 29.0 mmol/L    Calcium 9.5 8.6 - 10.5 mg/dL    BUN/Creatinine Ratio 19.5 7.0 - 25.0    Anion Gap 9.6 5.0 - 15.0 mmol/L    eGFR 69.1 >60.0 mL/min/1.73   ECG 12 Lead   Result Value Ref Range    QT Interval 351 ms   Tissue Pathology Exam    Specimen: A: Prostate; Calculus    B: Urinary Bladder; Calculus   Result Value Ref Range    Case Report       Surgical Pathology Report                         Case: [id number]                                  Authorizing Provider:  Be Espana MD      Collected:           05/05/2022 10:27 AM          Ordering Location:     [facility] Received:            05/05/2022 12:31 PM                                 OR                                                                           Pathologist:           Anju Agudelo DO                                                       Specimens:   1) - Prostate, PROSTATE CHIPS                                                                       2) - Urinary Bladder, BLADDER STONES                                                        Clinical Information      Final Diagnosis       1. Prostate gland, transurethral resection:   - Benign prostatic hyperplasia    2.  Bladder stones, removal:   - Calculi, sent for chemical analysis (gross only diagnosis)       Gross Description       1. Prostate.  Prostate chips: Received in formalin are pink rubbery tissue fragments filtered and measuring 4 x 4 x 2 cm in aggregate weighing 8 g.  All 1A-1G.      2. Urinary Bladder.  Bladder stones: Received in a dry container are numerous dark yellow irregular fragmented stones measuring 4.5 cm in greatest aggregate dimension.  Gross examination only.   Reviewed by GJKHANG.   CRE        Microscopic Description        PSA    PSA 3/24/22   PSA 0.579               Irrigation of Bladder    Date/Time: 5/9/2022 12:22 PM  Performed by: Chiara Tam APRN  Authorized by: Chiara Tam APRN   Preparation: Patient was prepped and draped in the usual sterile fashion.  Local anesthesia used: no    Anesthesia:  Local anesthesia used: no    Sedation:  Patient sedated: no    Patient tolerance: patient tolerated the procedure well with no immediate complications  Comments: 240 CC sterile water instilled into bladder. Patient was able to void on his own after and PVR was 0               Assessment and Plan    Diagnoses and all orders for this visit:    1. Benign prostatic hyperplasia with incomplete bladder emptying (Primary)        Discussed with patient that some light hematuria is expected for the next several days after catheter removal.    We did discuss normal postoperative course including bladder leakage or potentially refractory retention.    Patient will call the office if he is with any worsening hematuria, pain, urinary retention.    Patient will return in 1 week for PVR check he will call the office if he is with any new or worsening symptoms.    I spent 10 minutes caring for Carlos on this date of service. This time  includes time spent by me in the following activities:reviewing tests, obtaining and/or reviewing a separately obtained history, performing a medically appropriate examination and/or evaluation , counseling and educating the patient/family/caregiver, ordering medications, tests, or procedures, and documenting information in the medical record  Follow Up   Return in about 1 week (around 5/16/2022) for f/u PVR check .  Patient was given instructions and counseling regarding his condition or for health maintenance advice. Please see specific information pulled into the AVS if appropriate.         This document has been electronically signed by MICKEY Jones  May 9, 2022 08:25 EDT

## 2022-05-13 LAB
COLOR STONE: NORMAL
COMPN STONE: NORMAL
LABORATORY COMMENT REPORT: NORMAL
LABORATORY COMMENT REPORT: NORMAL
Lab: NORMAL
Lab: NORMAL
PHOTO: NORMAL
SIZE STONE: NORMAL MM
SPEC SOURCE SUBJ: NORMAL
URATE MFR STONE: 100 %
WT STONE: 4744 MG

## 2022-05-17 ENCOUNTER — TELEPHONE (OUTPATIENT)
Dept: UROLOGY | Facility: CLINIC | Age: 72
End: 2022-05-17

## 2022-05-17 DIAGNOSIS — N20.0 NEPHROLITHIASIS: Primary | ICD-10-CM

## 2022-05-17 NOTE — TELEPHONE ENCOUNTER
Spoke with patient, made him aware  wants blood work prior to his appt on 5/23/22. Patient voiced understanding and is aware he can go to the lab at [place] to complete bloodwork.

## 2022-05-17 NOTE — TELEPHONE ENCOUNTER
----- Message from Be Espana MD sent at 5/17/2022  8:11 AM EDT -----  Patient needs a serum uric acid before his follow-up appointment.

## 2022-05-18 ENCOUNTER — LAB (OUTPATIENT)
Dept: LAB | Facility: HOSPITAL | Age: 72
End: 2022-05-18

## 2022-05-18 DIAGNOSIS — N20.0 NEPHROLITHIASIS: ICD-10-CM

## 2022-05-18 LAB — URATE SERPL-MCNC: 8 MG/DL (ref 3.4–7)

## 2022-05-18 PROCEDURE — 36415 COLL VENOUS BLD VENIPUNCTURE: CPT

## 2022-05-18 PROCEDURE — 84550 ASSAY OF BLOOD/URIC ACID: CPT

## 2022-05-23 ENCOUNTER — OFFICE VISIT (OUTPATIENT)
Dept: UROLOGY | Facility: CLINIC | Age: 72
End: 2022-05-23

## 2022-05-23 VITALS — WEIGHT: 246 LBS | RESPIRATION RATE: 16 BRPM | BODY MASS INDEX: 36.43 KG/M2 | HEIGHT: 69 IN

## 2022-05-23 DIAGNOSIS — R39.14 BENIGN PROSTATIC HYPERPLASIA WITH INCOMPLETE BLADDER EMPTYING: ICD-10-CM

## 2022-05-23 DIAGNOSIS — R30.0 DYSURIA: ICD-10-CM

## 2022-05-23 DIAGNOSIS — N20.0 NEPHROLITHIASIS: Primary | ICD-10-CM

## 2022-05-23 DIAGNOSIS — N40.1 BENIGN PROSTATIC HYPERPLASIA WITH INCOMPLETE BLADDER EMPTYING: ICD-10-CM

## 2022-05-23 LAB
BILIRUB BLD-MCNC: NEGATIVE MG/DL
CLARITY, POC: CLEAR
COLOR UR: YELLOW
EXPIRATION DATE: ABNORMAL
GLUCOSE UR STRIP-MCNC: NEGATIVE MG/DL
KETONES UR QL: NEGATIVE
LEUKOCYTE EST, POC: ABNORMAL
Lab: ABNORMAL
NITRITE UR-MCNC: NEGATIVE MG/ML
PH UR: 5.5 [PH] (ref 5–8)
PROT UR STRIP-MCNC: ABNORMAL MG/DL
RBC # UR STRIP: ABNORMAL /UL
SP GR UR: 1.02 (ref 1–1.03)
URINE VOLUME: NORMAL
UROBILINOGEN UR QL: NORMAL

## 2022-05-23 PROCEDURE — 99024 POSTOP FOLLOW-UP VISIT: CPT | Performed by: NURSE PRACTITIONER

## 2022-05-23 PROCEDURE — 81003 URINALYSIS AUTO W/O SCOPE: CPT | Performed by: NURSE PRACTITIONER

## 2022-05-30 NOTE — PROGRESS NOTES
Chief Complaint: Benign Prostatic Hypertrophy    Subjective         History of Present Illness  Carlos Kaiser is a 71 y.o. male presents to [facility] UROLOGY to be seen for f/u PVR check  .    Patient is 2 and half weeks status post TURP cystolitholopaxy.    Patient states that he is still having quite a bit of dribbling with urination during the day and nocturia x5-6.    He also states some continued dysuria.    Previous:  Patient is 4 days post TURP cystolitholapaxy.    He states some hematuria still but improved.     No pain.     Objective     Past Medical History:   Diagnosis Date   • Benign prostatic hyperplasia    • Bladder stone    • Cancer (HCC)     COLON CANCER- 2012- HAD CHEMO/ RADIATION   • Hypertension        Past Surgical History:   Procedure Laterality Date   • CHOLECYSTECTOMY  2012   • COLON SURGERY  2012    R/T CANCER (2012 AND 2014)   • COLOSTOMY     • CYSTOSCOPY TRANSURETHRAL RESECTION OF PROSTATE N/A 5/5/2022    Procedure: cystolitholapaxy and transurethral resection of prostate;  Surgeon: Be Espana MD;  Location: [facility];  Service: Urology;  Laterality: N/A;         Current Outpatient Medications:   •  HYDROcodone-acetaminophen (NORCO) 5-325 MG per tablet, Take 1 tablet by mouth Every 6 (Six) Hours As Needed for Mild Pain ., Disp: 15 tablet, Rfl: 0  •  lisinopril-hydrochlorothiazide (PRINZIDE,ZESTORETIC) 20-25 MG per tablet, Take 1 tablet by mouth Daily., Disp: , Rfl:   •  potassium chloride (MICRO-K) 10 MEQ CR capsule, Take 10 mEq by mouth Daily., Disp: , Rfl:   •  terazosin (HYTRIN) 2 MG capsule, Take 2 mg by mouth Every Night., Disp: , Rfl:     Allergies   Allergen Reactions   • Penicillins Rash     CHILDHOOD ALLERGY- NOT HAD SINCE THEN        Family History   Problem Relation Age of Onset   • Cancer Mother    • Cancer Maternal Grandmother    • Malig Hyperthermia Neg Hx        Social History     Socioeconomic History   • Marital status:    Tobacco Use  "  • Smoking status: Never Smoker   • Smokeless tobacco: Never Used   Vaping Use   • Vaping Use: Never used   Substance and Sexual Activity   • Alcohol use: Never   • Drug use: Never   • Sexual activity: Defer       Vital Signs:   Resp 16   Ht 175.3 cm (69\")   Wt 112 kg (246 lb)   BMI 36.33 kg/m²      Physical Exam     Result Review :   The following data was reviewed by: MICKEY Jones on 05/23/2022:  Results for orders placed or performed in visit on 05/23/22   Bladder Scan   Result Value Ref Range    Urine Volume 0ml    POC Urinalysis Dipstick, Automated    Specimen: Urine   Result Value Ref Range    Color Yellow Yellow, Straw, Dark Yellow, Agnes    Clarity, UA Clear Clear    Specific Gravity  1.025 1.005 - 1.030    pH, Urine 5.5 5.0 - 8.0    Leukocytes Small (1+) (A) Negative    Nitrite, UA Negative Negative    Protein,  mg/dL (A) Negative mg/dL    Glucose, UA Negative Negative, 1000 mg/dL (3+) mg/dL    Ketones, UA Negative Negative    Urobilinogen, UA Normal Normal    Bilirubin Negative Negative    Blood, UA Large (A) Negative    Lot Number [id number]     Expiration Date 2/2,023       PSA    PSA 3/24/22   PSA 0.579         Bladder Scan interpretation 05/23/2022    Estimation of residual urine via BVI 3000 [address]athon Bladder Scan  MA/nurse performing: Delaney TAYLOR MA   Residual Urine: 0 ml  Indication: Nephrolithiasis    Benign prostatic hyperplasia with incomplete bladder emptying    Dysuria   Position: Supine  Examination: Incremental scanning of the suprapubic area using 2.0 MHz transducer using copious amounts of acoustic gel.   Findings: An anechoic area was demonstrated which represented the bladder, with measurement of residual urine as noted. I inspected this myself. In that the residual urine was insignificant, refer to plan for treatment and plan necessary at this time.           Procedures        Assessment and Plan    Diagnoses and all orders for this visit:    1. Nephrolithiasis " (Primary)    2. Benign prostatic hyperplasia with incomplete bladder emptying  -     Bladder Scan  -     POC Urinalysis Dipstick, Automated    3. Dysuria  -     Urine Culture - Urine, Urine, Clean Catch; Future      We will send patient's urine for culture today given his symptoms.    He will continue terazosin as prescribed and follow-up with Dr. Espana in 2 weeks as scheduled.    I spent 10 minutes caring for Carlos on this date of service. This time includes time spent by me in the following activities:reviewing tests, obtaining and/or reviewing a separately obtained history, performing a medically appropriate examination and/or evaluation , counseling and educating the patient/family/caregiver, ordering medications, tests, or procedures, and documenting information in the medical record  Follow Up   No follow-ups on file.  Patient was given instructions and counseling regarding his condition or for health maintenance advice. Please see specific information pulled into the AVS if appropriate.         This document has been electronically signed by MICKEY Jones  May 23, 2022 13:14 EDT        no

## 2022-06-04 PROBLEM — R35.0 BENIGN PROSTATIC HYPERPLASIA WITH URINARY FREQUENCY: Status: ACTIVE | Noted: 2022-03-20

## 2022-06-04 NOTE — PROGRESS NOTES
Chief Complaint: Urologic complaint    Subjective         History of Present Illness  Carlos Kaiser is a 71 y.o. male     BPH -status post TURP 5/22  Uric acid bladder stones  Gross hematuria      Patient is having some dribbling with standing.  He does have to wear pads when he goes out.  After discussion he does say most of his latency is with urgency.  Good stream.  No gross hematuria/dysuria or fevers.    5/22   1.1, GFR 69, calcium 9.5, uric acid 8.0, mildly elevated  5/22   uric acid bladder stones - 100%  5/22 cystolitholopaxy/ TURP -  6 -1 cm stones(total of 6 cm of stone)    Patient did have nitrite positive urine today.    PVR    6/6/22    005    3/22 UA  -  pH 5.5    Off Flomax and finasteride    on Terazosin 2 mg daily.  Patient thinks he is on this for BP.    No cardiopulmonary history.  Patient does not smoke.  Patient does not use blood thinner.    Previous    3/30/2022 CT abdomen/pelvis with and without -41 g prostate, 10 stones all about 1 cm.  Bilateral duplicated system, delayed phase okay.  Post treatment change in the presacral location with increased soft tissue density that could be seen with fibrosis.  Mass due to malignancy cannot be excluded.  If concern for residual mass consider PET    3/22   1.1, GFR 71    2/22 gross hematuria, never before    2/4/2022 CT abdomen/pelvis without - Flaget - new from 2015 - 6 stones in the urinary bladder, measuring up to 3 mm with Arnett in place.  No hydronephrosis.  Soft tissue density in the presacral space with some dystrophic calcification similar to 2015.  Few small renal cyst slightly increased from 2015.  Largest is 4 cm in the upper pole right kidney.  No renal or ureteral stones.  Colostomy with small parastomal hernia.  December 2015.    PVR      5/23/2022   000  3/22        35     1/22 UA-trace blood, urine culture negative    No dysuria or recurrent urinary tract infections.  No history of kidney stone.    No urologic family  history      PSA    4/22     0.57       Results for orders placed or performed in visit on 05/23/22   Bladder Scan   Result Value Ref Range    Urine Volume 0ml    POC Urinalysis Dipstick, Automated    Specimen: Urine   Result Value Ref Range    Color Yellow Yellow, Straw, Dark Yellow, Agnes    Clarity, UA Clear Clear    Specific Gravity  1.025 1.005 - 1.030    pH, Urine 5.5 5.0 - 8.0    Leukocytes Small (1+) (A) Negative    Nitrite, UA Negative Negative    Protein,  mg/dL (A) Negative mg/dL    Glucose, UA Negative Negative, 1000 mg/dL (3+) mg/dL    Ketones, UA Negative Negative    Urobilinogen, UA Normal Normal    Bilirubin Negative Negative    Blood, UA Large (A) Negative    Lot Number [id number]     Expiration Date 2/2,023          Objective     Past Medical History:   Diagnosis Date   • Benign prostatic hyperplasia    • Bladder stone    • Cancer (HCC)     COLON CANCER- 2012- HAD CHEMO/ RADIATION   • Hypertension        Past Surgical History:   Procedure Laterality Date   • CHOLECYSTECTOMY  2012   • COLON SURGERY  2012    R/T CANCER (2012 AND 2014)   • COLOSTOMY     • CYSTOSCOPY TRANSURETHRAL RESECTION OF PROSTATE N/A 5/5/2022    Procedure: cystolitholapaxy and transurethral resection of prostate;  Surgeon: Be Espana MD;  Location: [facility] MAIN OR;  Service: Urology;  Laterality: N/A;         Current Outpatient Medications:   •  HYDROcodone-acetaminophen (NORCO) 5-325 MG per tablet, Take 1 tablet by mouth Every 6 (Six) Hours As Needed for Mild Pain ., Disp: 15 tablet, Rfl: 0  •  lisinopril-hydrochlorothiazide (PRINZIDE,ZESTORETIC) 20-25 MG per tablet, Take 1 tablet by mouth Daily., Disp: , Rfl:   •  potassium chloride (MICRO-K) 10 MEQ CR capsule, Take 10 mEq by mouth Daily., Disp: , Rfl:   •  terazosin (HYTRIN) 2 MG capsule, Take 2 mg by mouth Every Night., Disp: , Rfl:     Allergies   Allergen Reactions   • Penicillins Rash     CHILDHOOD ALLERGY- NOT HAD SINCE THEN        Family History   Problem  Relation Age of Onset   • Cancer Mother    • Cancer Maternal Grandmother    • Malig Hyperthermia Neg Hx        Social History     Socioeconomic History   • Marital status:    Tobacco Use   • Smoking status: Never Smoker   • Smokeless tobacco: Never Used   Vaping Use   • Vaping Use: Never used   Substance and Sexual Activity   • Alcohol use: Never   • Drug use: Never   • Sexual activity: Defer       Vital Signs:   There were no vitals taken for this visit.     Physical exam    Alert and orient x3  Well appearing, well developed, in no acute distress   Unlabored respirations    Grossly oriented to person, place and time, judgment is intact, normal mood and affect    Results for orders placed or performed in visit on 05/23/22   Bladder Scan   Result Value Ref Range    Urine Volume 0ml    POC Urinalysis Dipstick, Automated    Specimen: Urine   Result Value Ref Range    Color Yellow Yellow, Straw, Dark Yellow, Agnes    Clarity, UA Clear Clear    Specific Gravity  1.025 1.005 - 1.030    pH, Urine 5.5 5.0 - 8.0    Leukocytes Small (1+) (A) Negative    Nitrite, UA Negative Negative    Protein,  mg/dL (A) Negative mg/dL    Glucose, UA Negative Negative, 1000 mg/dL (3+) mg/dL    Ketones, UA Negative Negative    Urobilinogen, UA Normal Normal    Bilirubin Negative Negative    Blood, UA Large (A) Negative    Lot Number [id number]     Expiration Date 2/2,023              Assessment and Plan    Diagnoses and all orders for this visit:    1. Bladder stones (Primary)    2. Benign prostatic hyperplasia with urinary frequency            BPH/bladder stone/ GH    Discussed that hopefully his leakage is mainly urgency and this will resolve in the next few months.    Nitrite positive urine today.  Urine culture today      Patient did have uric acid bladder stones, he also had 1 bout of gout earlier this year.     I will go ahead and have him see his PCP to discuss whether or not he should be on medication to lower serum uric  acid.      Because we have done a TURP I do feel he will be emptying his bladder better and hopefully will not form uric acid bladder stones back.    At this time we will hold off on potassium citrate for urinary alkalinization.        Follow-up in 3 months    PVR follow-up

## 2022-06-06 ENCOUNTER — OFFICE VISIT (OUTPATIENT)
Dept: UROLOGY | Facility: CLINIC | Age: 72
End: 2022-06-06

## 2022-06-06 VITALS — BODY MASS INDEX: 36.7 KG/M2 | WEIGHT: 247.8 LBS | HEIGHT: 69 IN | RESPIRATION RATE: 18 BRPM

## 2022-06-06 DIAGNOSIS — N40.1 BENIGN PROSTATIC HYPERPLASIA WITH URINARY FREQUENCY: ICD-10-CM

## 2022-06-06 DIAGNOSIS — R35.0 BENIGN PROSTATIC HYPERPLASIA WITH URINARY FREQUENCY: ICD-10-CM

## 2022-06-06 DIAGNOSIS — N21.0 BLADDER STONES: Primary | ICD-10-CM

## 2022-06-06 LAB
BILIRUB BLD-MCNC: NEGATIVE MG/DL
CLARITY, POC: ABNORMAL
COLOR UR: YELLOW
EXPIRATION DATE: ABNORMAL
GLUCOSE UR STRIP-MCNC: NEGATIVE MG/DL
KETONES UR QL: NEGATIVE
LEUKOCYTE EST, POC: ABNORMAL
Lab: ABNORMAL
NITRITE UR-MCNC: POSITIVE MG/ML
PH UR: 5.5 [PH] (ref 5–8)
PROT UR STRIP-MCNC: ABNORMAL MG/DL
RBC # UR STRIP: ABNORMAL /UL
SP GR UR: 1.03 (ref 1–1.03)
SPECIMEN VOL 24H UR: NORMAL L
UROBILINOGEN UR QL: NORMAL

## 2022-06-06 PROCEDURE — 87086 URINE CULTURE/COLONY COUNT: CPT | Performed by: UROLOGY

## 2022-06-06 PROCEDURE — 99024 POSTOP FOLLOW-UP VISIT: CPT | Performed by: UROLOGY

## 2022-06-06 PROCEDURE — 81003 URINALYSIS AUTO W/O SCOPE: CPT | Performed by: UROLOGY

## 2022-06-06 PROCEDURE — 51798 US URINE CAPACITY MEASURE: CPT | Performed by: UROLOGY

## 2022-06-06 PROCEDURE — 87147 CULTURE TYPE IMMUNOLOGIC: CPT | Performed by: UROLOGY

## 2022-06-08 ENCOUNTER — TELEPHONE (OUTPATIENT)
Dept: UROLOGY | Facility: CLINIC | Age: 72
End: 2022-06-08

## 2022-06-08 DIAGNOSIS — R31.0 GROSS HEMATURIA: Primary | ICD-10-CM

## 2022-06-08 LAB — BACTERIA SPEC AEROBE CULT: ABNORMAL

## 2022-06-08 NOTE — TELEPHONE ENCOUNTER
Called patient to let him know his recent ucx was contaminated and we will need another one done at his convenience. Order placed and patient said he will go to [facility] within the next week to do this.    ----- Message from Be Espana MD sent at 6/8/2022  6:16 AM EDT -----    Urine culture contaminated, I would want him to repeat if possible    ----- Message -----  From: Albino Wilkins RN  Sent: 6/6/2022   8:54 AM EDT  To: Be Espana MD

## 2022-06-13 ENCOUNTER — LAB (OUTPATIENT)
Dept: LAB | Facility: HOSPITAL | Age: 72
End: 2022-06-13

## 2022-06-13 DIAGNOSIS — R31.0 GROSS HEMATURIA: ICD-10-CM

## 2022-06-13 PROCEDURE — 87147 CULTURE TYPE IMMUNOLOGIC: CPT

## 2022-06-13 PROCEDURE — 87086 URINE CULTURE/COLONY COUNT: CPT

## 2022-06-15 LAB — BACTERIA SPEC AEROBE CULT: ABNORMAL

## 2022-06-21 ENCOUNTER — TELEPHONE (OUTPATIENT)
Dept: UROLOGY | Facility: CLINIC | Age: 72
End: 2022-06-21

## 2022-06-21 NOTE — TELEPHONE ENCOUNTER
LVM for patient to call office back regarding ucx results     ----- Message from Be Espana MD sent at 6/21/2022  9:56 AM EDT -----  Urine culture contaminated, if he is having any urinary symptoms we can go ahead and repeat culture.  Otherwise we can wait and for his follow-up appointment

## 2022-06-23 NOTE — TELEPHONE ENCOUNTER
Pt called and I gave him the message. Pt said at this time hes ok but concerned that this is the second culture this month that has came back contaminated. He said he will call us back if he feels like he needs to leave another sample.

## 2022-11-02 ENCOUNTER — TELEPHONE (OUTPATIENT)
Dept: UROLOGY | Facility: CLINIC | Age: 72
End: 2022-11-02

## 2022-11-02 NOTE — TELEPHONE ENCOUNTER
----- Message from Albino Wilkins RN sent at 11/2/2022  9:15 AM EDT -----  Will you please let patient know we need to move his appt out to when we are in [place] in January? We can refill meds if he needs us to. Thanks

## 2022-11-02 NOTE — TELEPHONE ENCOUNTER
LVM FOR PT TO CALL OFFICE BACK TO R/S 11/7 APPT IN [facility] TO [place] IN TIM PER ABDOULAYE/MS

## 2022-11-04 PROBLEM — N40.1 BENIGN PROSTATIC HYPERPLASIA WITH LOWER URINARY TRACT SYMPTOMS: Status: ACTIVE | Noted: 2022-11-04

## 2022-11-04 NOTE — TELEPHONE ENCOUNTER
2ND ATTEMPT LVM FOR PT TO CALL OFFICE BACK TO R/S 11/7 APPT IN [place] TO [place] IN TIM PER ABDOULAYE/MS

## 2022-11-04 NOTE — PROGRESS NOTES
Chief Complaint: Urologic complaint    Subjective         History of Present Illness  Carlos Kaiser is a 72 y.o. male     BPH -status post TURP 5/22  Uric acid bladder stones  Gross hematuria      Patient is having to wear pants, has to change twice daily.  Leakage is variable.    Good stream.  No straining.    NoUTI    No GH    5/22   1.1, GFR 69, calcium 9.5, uric acid 8.0, mildly elevated  5/22   uric acid bladder stones - 100%      5/22 cystolitholopaxy/ TURP -  6 -1 cm stones(total of 6 cm of stone)     nitrite positive urine.     PVR    11/22    000  6/6/22    005    3/22 UA  -  pH 5.5      on Terazosin 2 mg daily.   on this for BP.    No cardiopulmonary history.  Patient does not smoke.  Patient does not use blood thinner.    Results for orders placed or performed in visit on 11/07/22   Bladder Scan   Result Value Ref Range    Volume 0ML    POC Urinalysis Dipstick, Automated    Specimen: Urine   Result Value Ref Range    Color Yellow Yellow, Straw, Dark Yellow, Agnes    Clarity, UA Cloudy (A) Clear    Specific Gravity  1.030 1.005 - 1.030    pH, Urine 5.0 5.0 - 8.0    Leukocytes Small (1+) (A) Negative    Nitrite, UA Positive (A) Negative    Protein, POC 2+ (A) Negative mg/dL    Glucose, UA Negative Negative mg/dL    Ketones, UA Negative Negative    Urobilinogen, UA Normal Normal, 0.2 E.U./dL    Bilirubin Negative Negative    Blood, UA Moderate (A) Negative    Lot Number [id number]     Expiration Date 8/2,023            Previous    3/30/2022 CT abdomen/pelvis with and without -41 g prostate, 10 stones all about 1 cm.  Bilateral duplicated system, delayed phase okay.  Post treatment change in the presacral location with increased soft tissue density that could be seen with fibrosis.  Mass due to malignancy cannot be excluded.  If concern for residual mass consider PET    3/22   1.1, GFR 71    2/22 gross hematuria, never before    2/4/2022 CT abdomen/pelvis without - Flaget - new from 2015 - 6 stones in the urinary  bladder, measuring up to 3 mm with Arnett in place.  No hydronephrosis.  Soft tissue density in the presacral space with some dystrophic calcification similar to 2015.  Few small renal cyst slightly increased from 2015.  Largest is 4 cm in the upper pole right kidney.  No renal or ureteral stones.  Colostomy with small parastomal hernia.  December 2015.    PVR      5/23/2022   000  3/22        35     1/22 UA-trace blood, urine culture negative     No history of kidney stone.    No urologic family history      PSA    4/22     0.57       Results for orders placed or performed in visit on 06/13/22   Urine Culture - Urine, Urine, Random Void    Specimen: Urine, Random Void   Result Value Ref Range    Urine Culture >100,000 CFU/mL Staphylococcus, coagulase negative (A)          Objective     Past Medical History:   Diagnosis Date   • Benign prostatic hyperplasia    • Bladder stone    • Cancer (HCC)     COLON CANCER- 2012- HAD CHEMO/ RADIATION   • Hypertension        Past Surgical History:   Procedure Laterality Date   • CHOLECYSTECTOMY  2012   • COLON SURGERY  2012    R/T CANCER (2012 AND 2014)   • COLOSTOMY     • CYSTOSCOPY TRANSURETHRAL RESECTION OF PROSTATE N/A 5/5/2022    Procedure: cystolitholapaxy and transurethral resection of prostate;  Surgeon: Be Espana MD;  Location: [facility];  Service: Urology;  Laterality: N/A;         Current Outpatient Medications:   •  lisinopril-hydrochlorothiazide (PRINZIDE,ZESTORETIC) 20-25 MG per tablet, Take 1 tablet by mouth Daily., Disp: , Rfl:   •  potassium chloride (MICRO-K) 10 MEQ CR capsule, Take 10 mEq by mouth Daily., Disp: , Rfl:   •  terazosin (HYTRIN) 2 MG capsule, Take 2 mg by mouth Every Night., Disp: , Rfl:     Allergies   Allergen Reactions   • Penicillins Rash     CHILDHOOD ALLERGY- NOT HAD SINCE THEN        Family History   Problem Relation Age of Onset   • Cancer Mother    • Cancer Maternal Grandmother    • Malig Hyperthermia Neg Hx        Social  History     Socioeconomic History   • Marital status:    Tobacco Use   • Smoking status: Never   • Smokeless tobacco: Never   Vaping Use   • Vaping Use: Never used   Substance and Sexual Activity   • Alcohol use: Never   • Drug use: Never   • Sexual activity: Defer       Vital Signs:   There were no vitals taken for this visit.         Results for orders placed or performed in visit on 06/13/22   Urine Culture - Urine, Urine, Random Void    Specimen: Urine, Random Void   Result Value Ref Range    Urine Culture >100,000 CFU/mL Staphylococcus, coagulase negative (A)              Assessment and Plan    Diagnoses and all orders for this visit:    1. Benign prostatic hyperplasia with lower urinary tract symptoms, symptom details unspecified (Primary)      Patient is from [facility] appointments      BPH/bladder stone/ GH         Nitrite positive urine today.  Urine culture today -last time came back as a contaminant  -  no symptoms today.        Stress incontinence status post TURP      Discussed with the patient at this time this is likely postsurgical and will probably not get any better at this point.  Patient will continue to use pads.      Follow-up in 1 year with UA with micro      PVR follow-up

## 2022-11-07 ENCOUNTER — OFFICE VISIT (OUTPATIENT)
Dept: UROLOGY | Facility: CLINIC | Age: 72
End: 2022-11-07

## 2022-11-07 VITALS — BODY MASS INDEX: 36.56 KG/M2 | RESPIRATION RATE: 19 BRPM | WEIGHT: 247.6 LBS

## 2022-11-07 DIAGNOSIS — N40.1 BENIGN PROSTATIC HYPERPLASIA WITH LOWER URINARY TRACT SYMPTOMS, SYMPTOM DETAILS UNSPECIFIED: Primary | ICD-10-CM

## 2022-11-07 DIAGNOSIS — N39.3 SUI (STRESS URINARY INCONTINENCE), MALE: ICD-10-CM

## 2022-11-07 LAB
BILIRUB BLD-MCNC: NEGATIVE MG/DL
CLARITY, POC: ABNORMAL
COLOR UR: YELLOW
EXPIRATION DATE: ABNORMAL
GLUCOSE UR STRIP-MCNC: NEGATIVE MG/DL
KETONES UR QL: NEGATIVE
LEUKOCYTE EST, POC: ABNORMAL
Lab: ABNORMAL
NITRITE UR-MCNC: POSITIVE MG/ML
PH UR: 5 [PH] (ref 5–8)
PROT UR STRIP-MCNC: ABNORMAL MG/DL
RBC # UR STRIP: ABNORMAL /UL
SP GR UR: 1.03 (ref 1–1.03)
SPECIMEN VOL 24H UR: NORMAL L
UROBILINOGEN UR QL: NORMAL

## 2022-11-07 PROCEDURE — 87147 CULTURE TYPE IMMUNOLOGIC: CPT | Performed by: UROLOGY

## 2022-11-07 PROCEDURE — 81003 URINALYSIS AUTO W/O SCOPE: CPT | Performed by: UROLOGY

## 2022-11-07 PROCEDURE — 87186 SC STD MICRODIL/AGAR DIL: CPT | Performed by: UROLOGY

## 2022-11-07 PROCEDURE — 51798 US URINE CAPACITY MEASURE: CPT | Performed by: UROLOGY

## 2022-11-07 PROCEDURE — 87086 URINE CULTURE/COLONY COUNT: CPT | Performed by: UROLOGY

## 2022-11-07 PROCEDURE — 99213 OFFICE O/P EST LOW 20 MIN: CPT | Performed by: UROLOGY

## 2022-11-11 LAB — BACTERIA SPEC AEROBE CULT: ABNORMAL

## 2023-12-01 NOTE — PROGRESS NOTES
Chief Complaint: Urologic complaint    Subjective         History of Present Illness  Carlos Kaiser is a 73 y.o. male     BPH -status post TURP 5/22  Uric acid bladder stones  Gross hematuria      Leakage is bothersome, stable since last year.  3-4 depends daily      Good stream.  No straining.  Unchanged.  Stream  variable      No GH    1 UTI in the last year      Previous    5/22   1.1, GFR 69, calcium 9.5, uric acid 8.0, mildly elevated  5/22   uric acid bladder stones - 100%      5/22 cystolitholopaxy/ TURP -  6 -1 cm stones(total of 6 cm of stone)      PVR    12/23     026 -UA large blood, nitrite positive  11/22    000  6/6/22    005    3/22 UA  -  pH 5.5    No cardiopulmonary history.  Patient does not smoke.  Patient does not use blood thinner.          3/30/2022 CT abdomen/pelvis with and without -41 g prostate, 10 stones all about 1 cm.  Bilateral duplicated system, delayed phase okay.  Post treatment change in the presacral location with increased soft tissue density that could be seen with fibrosis.  Mass due to malignancy cannot be excluded.  If concern for residual mass consider PET    3/22   1.1, GFR 71    2/22 gross hematuria, never before    2/4/2022 CT abdomen/pelvis without - Flaget - new from 2015 - 6 stones in the urinary bladder, measuring up to 3 mm with Arnett in place.  No hydronephrosis.  Soft tissue density in the presacral space with some dystrophic calcification similar to 2015.  Few small renal cyst slightly increased from 2015.  Largest is 4 cm in the upper pole right kidney.  No renal or ureteral stones.  Colostomy with small parastomal hernia.  December 2015.    PVR  Bladder Scan interpretation 12/04/2023    Estimation of residual urine via BVI 3000 [place] Bladder Scan  MA/nurse performing: Madison MISHRA  Residual Urine: 26 ml  Indication: Benign prostatic hyperplasia with lower urinary tract symptoms, symptom details unspecified    Bladder stones   Position: Supine  Examination:  Incremental scanning of the suprapubic area using 2.0 MHz transducer using copious amounts of acoustic gel.   Findings: An anechoic area was demonstrated which represented the bladder, with measurement of residual urine as noted. I inspected this myself. In that the residual urine was stable or insignificant, refer to plan for treatment and plan necessary at this time.          No history of kidney stone.    No urologic family history      PSA    4/22     0.57             Objective     Past Medical History:   Diagnosis Date    Benign prostatic hyperplasia     Bladder stone     Cancer     COLON CANCER- 2012- HAD CHEMO/ RADIATION    Hypertension        Past Surgical History:   Procedure Laterality Date    CHOLECYSTECTOMY  2012    COLON SURGERY  2012    R/T CANCER (2012 AND 2014)    COLOSTOMY      CYSTOSCOPY TRANSURETHRAL RESECTION OF PROSTATE N/A 5/5/2022    Procedure: cystolitholapaxy and transurethral resection of prostate;  Surgeon: Be Espana MD;  Location: [facility] OR;  Service: Urology;  Laterality: N/A;         Current Outpatient Medications:     lisinopril-hydrochlorothiazide (PRINZIDE,ZESTORETIC) 20-25 MG per tablet, Take 1 tablet by mouth Daily., Disp: , Rfl:     potassium chloride (MICRO-K) 10 MEQ CR capsule, Take 10 mEq by mouth Daily., Disp: , Rfl:     terazosin (HYTRIN) 2 MG capsule, Take 2 mg by mouth Every Night., Disp: , Rfl:     Allergies   Allergen Reactions    Penicillins Rash     CHILDHOOD ALLERGY- NOT HAD SINCE THEN        Family History   Problem Relation Age of Onset    Cancer Mother     Cancer Maternal Grandmother     Malig Hyperthermia Neg Hx        Social History     Socioeconomic History    Marital status:    Tobacco Use    Smoking status: Never    Smokeless tobacco: Never   Vaping Use    Vaping Use: Never used   Substance and Sexual Activity    Alcohol use: Never    Drug use: Never    Sexual activity: Defer       Vital Signs:   There were no vitals taken for this visit.                    Assessment and Plan    Diagnoses and all orders for this visit:    1. Benign prostatic hyperplasia with lower urinary tract symptoms, symptom details unspecified (Primary)    2. Bladder stones            BPH/bladder stone/ GH       Voiding ok.       Nitrite positive urine today, urine culture today.        Because of his history of uric acid bladder stones I will have him follow-up in 18 months with a CT abdomen/pelvis without.  We discussed starting potassium citrate but unless the stones form back I do not think we would do this.  Patient voiced understanding.          Stress incontinence status post TURP        Patient will continue to use pads.  Not interested in surgery                Patient is from [facility] appointments

## 2023-12-04 ENCOUNTER — OFFICE VISIT (OUTPATIENT)
Dept: UROLOGY | Facility: CLINIC | Age: 73
End: 2023-12-04
Payer: MEDICARE

## 2023-12-04 VITALS — HEIGHT: 69 IN | RESPIRATION RATE: 16 BRPM | WEIGHT: 262 LBS | BODY MASS INDEX: 38.8 KG/M2

## 2023-12-04 DIAGNOSIS — N40.1 BENIGN PROSTATIC HYPERPLASIA WITH LOWER URINARY TRACT SYMPTOMS, SYMPTOM DETAILS UNSPECIFIED: ICD-10-CM

## 2023-12-04 DIAGNOSIS — N21.0 BLADDER STONES: Primary | ICD-10-CM

## 2023-12-04 LAB
BILIRUB BLD-MCNC: NEGATIVE MG/DL
CLARITY, POC: CLEAR
COLOR UR: YELLOW
EXPIRATION DATE: ABNORMAL
GLUCOSE UR STRIP-MCNC: NEGATIVE MG/DL
KETONES UR QL: NEGATIVE
LEUKOCYTE EST, POC: ABNORMAL
Lab: ABNORMAL
NITRITE UR-MCNC: POSITIVE MG/ML
PH UR: 5.5 [PH] (ref 5–8)
PROT UR STRIP-MCNC: ABNORMAL MG/DL
RBC # UR STRIP: ABNORMAL /UL
SP GR UR: 1.03 (ref 1–1.03)
SPECIMEN VOL 24H UR: 26 L
UROBILINOGEN UR QL: ABNORMAL

## 2023-12-04 PROCEDURE — 1159F MED LIST DOCD IN RCRD: CPT | Performed by: UROLOGY

## 2023-12-04 PROCEDURE — 87086 URINE CULTURE/COLONY COUNT: CPT | Performed by: UROLOGY

## 2023-12-04 PROCEDURE — 81003 URINALYSIS AUTO W/O SCOPE: CPT | Performed by: UROLOGY

## 2023-12-04 PROCEDURE — 99213 OFFICE O/P EST LOW 20 MIN: CPT | Performed by: UROLOGY

## 2023-12-04 PROCEDURE — 1160F RVW MEDS BY RX/DR IN RCRD: CPT | Performed by: UROLOGY

## 2023-12-04 PROCEDURE — 51798 US URINE CAPACITY MEASURE: CPT | Performed by: UROLOGY

## 2023-12-04 PROCEDURE — 87077 CULTURE AEROBIC IDENTIFY: CPT | Performed by: UROLOGY

## 2023-12-06 LAB — BACTERIA SPEC AEROBE CULT: NORMAL

## 2024-12-20 ENCOUNTER — TELEPHONE (OUTPATIENT)
Dept: UROLOGY | Age: 74
End: 2024-12-20
Payer: MEDICARE

## 2024-12-20 DIAGNOSIS — N21.0 BLADDER STONES: Primary | ICD-10-CM

## 2024-12-20 NOTE — TELEPHONE ENCOUNTER
CALLED PT TO INFORM HIM ORDERS FOR CT ARE IN AND PROVIDE HIM W/ PHONE NUMBER TO SCHEDULE AND SET UP F/U W/ HERRMANN.    NO ANSWER, LMOM    OK FOR HUB TO RELAY/SCHEDULE

## 2024-12-20 NOTE — TELEPHONE ENCOUNTER
Caller: Carlos Kaiser    Relationship to patient: Self    Best call back number: [phone] (home)       Chief complaint:     Type of visit: 18 MO F/U WITH CT PRIOR      Additional notes:PT HAS RECALL FOR 18MO F/U WITH CT PRIOR. PLEASE PLACE ORDERS FOR IMAGING FOR PT TO SCHEDULE.

## 2024-12-23 ENCOUNTER — TELEPHONE (OUTPATIENT)
Dept: UROLOGY | Age: 74
End: 2024-12-23
Payer: MEDICARE

## 2025-01-20 ENCOUNTER — HOSPITAL ENCOUNTER (OUTPATIENT)
Dept: CT IMAGING | Facility: HOSPITAL | Age: 75
Discharge: HOME | End: 2025-01-20
Admitting: UROLOGY
Payer: MEDICARE

## 2025-01-20 DIAGNOSIS — N21.0 BLADDER STONES: ICD-10-CM

## 2025-01-20 PROCEDURE — 74176 CT ABD & PELVIS W/O CONTRAST: CPT

## 2025-01-30 NOTE — PROGRESS NOTES
Chief Complaint: Urologic complaint    Subjective         History of Present Illness  Carlos Kaiser is a 74 y.o. male         BPH -status post TURP 5/22  Uric acid bladder stones  Gross hematuria        1/21/2025 CT abdomen/pelvis without - 2 mm calcification right mid kidney nonobstructive.  Bladder calculi no longer present    Incontinence and TURP. 2-3 pads QD.  No pads at night.    Ok stream.   No straining.    Terazosin 2 mg daily.    No GH    1 UTI in the last year      No cardiopulmonary history.  Patient does not smoke.  Patient does not use blood thinner.      Previous    5/22   1.1, GFR 69, calcium 9.5, uric acid 8.0, mildly elevated  5/22   uric acid bladder stones - 100%      5/22 cystolitholopaxy/ TURP -  6 -1 cm stones(total of 6 cm of stone)      PVR    12/23      026 -UA large blood, nitrite positive  11/22      000  6/6/22     005    3/22 UA  -  pH 5.5    3/30/2022 CT abdomen/pelvis with and without -41 g prostate, 10 stones all about 1 cm.  Bilateral duplicated system, delayed phase okay.  Post treatment change in the presacral location with increased soft tissue density that could be seen with fibrosis.  Mass due to malignancy cannot be excluded.  If concern for residual mass consider PET    3/22   1.1, GFR 71    2/22 gross hematuria, never before    2/4/2022 CT abdomen/pelvis without - Flaget - new from 2015 - 6 stones in the urinary bladder, measuring up to 3 mm with Arnett in place.  No hydronephrosis.  Soft tissue density in the presacral space with some dystrophic calcification similar to 2015.  Few small renal cyst slightly increased from 2015.  Largest is 4 cm in the upper pole right kidney.  No renal or ureteral stones.  Colostomy with small parastomal hernia.  December 2015.      No history of kidney stone.    No urologic family history      PSA    4/22     0.57             Objective     Past Medical History:   Diagnosis Date    Benign prostatic hyperplasia     Bladder stone     Cancer      COLON CANCER- 2012- HAD CHEMO/ RADIATION    Hypertension              Vital Signs:   There were no vitals taken for this visit.                   Assessment and Plan    Diagnoses and all orders for this visit:    1. Benign prostatic hyperplasia with lower urinary tract symptoms, symptom details unspecified (Primary)    2. Bladder stone    3. TAYA (stress urinary incontinence), male            BPH/bladder stone/ GH     Doing well, CT negative for stones.  Patient given reassurance      Follow-up as needed.

## 2025-02-03 ENCOUNTER — OFFICE VISIT (OUTPATIENT)
Dept: UROLOGY | Facility: CLINIC | Age: 75
End: 2025-02-03
Payer: MEDICARE

## 2025-02-03 VITALS — BODY MASS INDEX: 38.69 KG/M2 | WEIGHT: 262 LBS

## 2025-02-03 DIAGNOSIS — N39.3 SUI (STRESS URINARY INCONTINENCE), MALE: ICD-10-CM

## 2025-02-03 DIAGNOSIS — N21.0 BLADDER STONE: ICD-10-CM

## 2025-02-03 DIAGNOSIS — N40.1 BENIGN PROSTATIC HYPERPLASIA WITH LOWER URINARY TRACT SYMPTOMS, SYMPTOM DETAILS UNSPECIFIED: Primary | ICD-10-CM

## 2025-02-03 PROCEDURE — 1159F MED LIST DOCD IN RCRD: CPT | Performed by: UROLOGY

## 2025-02-03 PROCEDURE — 99214 OFFICE O/P EST MOD 30 MIN: CPT | Performed by: UROLOGY

## 2025-02-03 PROCEDURE — 1160F RVW MEDS BY RX/DR IN RCRD: CPT | Performed by: UROLOGY

## (undated) DEVICE — TRY PREP SCRB VAG PVP

## (undated) DEVICE — HF-RESECTION ELECTRODE PLASMALOOP LOOP, LARGE, 24 FR., 12°-30°, PK TURIS: Brand: OLYMPUS

## (undated) DEVICE — Y-TYPE TUR/BLADDER IRRIGATION SET, REGULATING CLAMP

## (undated) DEVICE — TRAY,IRRIGATION,PISTON SYRINGE,60ML: Brand: MEDLINE

## (undated) DEVICE — Device

## (undated) DEVICE — SOL IRR NACL 0.9PCT 3000ML

## (undated) DEVICE — PROB LITHO SHOCKPULSE 1P/U

## (undated) DEVICE — GOWN,REINFORCE,POLY,SIRUS,BREATH SLV,XLG: Brand: MEDLINE

## (undated) DEVICE — GLV SURG SENSICARE SLT PF LF 8 STRL

## (undated) DEVICE — CUP SPECI 4OZ LF STRL

## (undated) DEVICE — SYR CATH/TIP 50ML 2OZ STRL 1P/U

## (undated) DEVICE — SLV SCD KN/LEN ADJ EXPRSS BLENDED MD 1P/U

## (undated) DEVICE — TRAY,ADD A CATH,FOLEY,DRAIN BG,10ML SYR: Brand: MEDLINE

## (undated) DEVICE — MAT FLR ABS W/BLU/LINER 56X72IN WHT

## (undated) DEVICE — METER,URINE,400ML,DRAIN BAG,L/F,LL,SLIDE: Brand: MEDLINE

## (undated) DEVICE — EVAC BLDR ELLIK 1P/U

## (undated) DEVICE — CYSTO PACK: Brand: MEDLINE INDUSTRIES, INC.

## (undated) DEVICE — CATH FOLEY LUBRICATH 3WY 22F 30CC